# Patient Record
Sex: MALE | Race: ASIAN | NOT HISPANIC OR LATINO | Employment: FULL TIME | URBAN - METROPOLITAN AREA
[De-identification: names, ages, dates, MRNs, and addresses within clinical notes are randomized per-mention and may not be internally consistent; named-entity substitution may affect disease eponyms.]

---

## 2017-02-02 ENCOUNTER — OFFICE VISIT (OUTPATIENT)
Dept: FAMILY MEDICINE | Facility: CLINIC | Age: 62
End: 2017-02-02
Payer: COMMERCIAL

## 2017-02-02 VITALS
HEIGHT: 68 IN | WEIGHT: 163 LBS | BODY MASS INDEX: 24.71 KG/M2 | SYSTOLIC BLOOD PRESSURE: 122 MMHG | HEART RATE: 74 BPM | DIASTOLIC BLOOD PRESSURE: 70 MMHG | TEMPERATURE: 97 F

## 2017-02-02 DIAGNOSIS — Z12.5 SCREENING FOR PROSTATE CANCER: ICD-10-CM

## 2017-02-02 DIAGNOSIS — Z71.89 ADVANCED DIRECTIVES, COUNSELING/DISCUSSION: ICD-10-CM

## 2017-02-02 DIAGNOSIS — Z00.00 HEALTH MAINTENANCE EXAMINATION: Primary | ICD-10-CM

## 2017-02-02 LAB
ALT SERPL W P-5'-P-CCNC: 64 U/L (ref 0–70)
ANION GAP SERPL CALCULATED.3IONS-SCNC: 5 MMOL/L (ref 3–14)
BASOPHILS # BLD AUTO: 0 10E9/L (ref 0–0.2)
BASOPHILS NFR BLD AUTO: 0.3 %
BUN SERPL-MCNC: 17 MG/DL (ref 7–30)
CALCIUM SERPL-MCNC: 8.8 MG/DL (ref 8.5–10.1)
CHLORIDE SERPL-SCNC: 107 MMOL/L (ref 94–109)
CHOLEST SERPL-MCNC: 216 MG/DL
CO2 SERPL-SCNC: 30 MMOL/L (ref 20–32)
CREAT SERPL-MCNC: 0.72 MG/DL (ref 0.66–1.25)
DIFFERENTIAL METHOD BLD: ABNORMAL
EOSINOPHIL # BLD AUTO: 0.1 10E9/L (ref 0–0.7)
EOSINOPHIL NFR BLD AUTO: 1.2 %
ERYTHROCYTE [DISTWIDTH] IN BLOOD BY AUTOMATED COUNT: 18.6 % (ref 10–15)
GFR SERPL CREATININE-BSD FRML MDRD: ABNORMAL ML/MIN/1.7M2
GLUCOSE SERPL-MCNC: 122 MG/DL (ref 70–99)
HCT VFR BLD AUTO: 40.5 % (ref 40–53)
HDLC SERPL-MCNC: 51 MG/DL
HGB BLD-MCNC: 13.1 G/DL (ref 13.3–17.7)
LDLC SERPL CALC-MCNC: 148 MG/DL
LYMPHOCYTES # BLD AUTO: 1.8 10E9/L (ref 0.8–5.3)
LYMPHOCYTES NFR BLD AUTO: 26 %
MCH RBC QN AUTO: 21.1 PG (ref 26.5–33)
MCHC RBC AUTO-ENTMCNC: 32.3 G/DL (ref 31.5–36.5)
MCV RBC AUTO: 65 FL (ref 78–100)
MONOCYTES # BLD AUTO: 0.4 10E9/L (ref 0–1.3)
MONOCYTES NFR BLD AUTO: 6.1 %
NEUTROPHILS # BLD AUTO: 4.6 10E9/L (ref 1.6–8.3)
NEUTROPHILS NFR BLD AUTO: 66.4 %
NONHDLC SERPL-MCNC: 165 MG/DL
PLATELET # BLD AUTO: 279 10E9/L (ref 150–450)
POTASSIUM SERPL-SCNC: 4.4 MMOL/L (ref 3.4–5.3)
PSA SERPL-ACNC: 5.94 UG/L (ref 0–4)
RBC # BLD AUTO: 6.22 10E12/L (ref 4.4–5.9)
SODIUM SERPL-SCNC: 142 MMOL/L (ref 133–144)
TRIGL SERPL-MCNC: 86 MG/DL
WBC # BLD AUTO: 6.9 10E9/L (ref 4–11)

## 2017-02-02 PROCEDURE — 99396 PREV VISIT EST AGE 40-64: CPT | Performed by: FAMILY MEDICINE

## 2017-02-02 PROCEDURE — 84460 ALANINE AMINO (ALT) (SGPT): CPT | Performed by: FAMILY MEDICINE

## 2017-02-02 PROCEDURE — 80061 LIPID PANEL: CPT | Performed by: FAMILY MEDICINE

## 2017-02-02 PROCEDURE — 80048 BASIC METABOLIC PNL TOTAL CA: CPT | Performed by: FAMILY MEDICINE

## 2017-02-02 PROCEDURE — 36415 COLL VENOUS BLD VENIPUNCTURE: CPT | Performed by: FAMILY MEDICINE

## 2017-02-02 PROCEDURE — G0103 PSA SCREENING: HCPCS | Performed by: FAMILY MEDICINE

## 2017-02-02 PROCEDURE — 85025 COMPLETE CBC W/AUTO DIFF WBC: CPT | Performed by: FAMILY MEDICINE

## 2017-02-02 NOTE — NURSING NOTE
"Chief Complaint   Patient presents with     Physical     is fasting       Initial /70 mmHg  Pulse 74  Temp(Src) 97  F (36.1  C) (Tympanic)  Ht 5' 8\" (1.727 m)  Wt 163 lb (73.936 kg)  BMI 24.79 kg/m2 Estimated body mass index is 24.79 kg/(m^2) as calculated from the following:    Height as of this encounter: 5' 8\" (1.727 m).    Weight as of this encounter: 163 lb (73.936 kg).  BP completed using cuff size: davy Farfan CMA    "

## 2017-02-02 NOTE — PROGRESS NOTES
SUBJECTIVE:     CC: Francoise Valerio is an 61 year old male who presents for preventative health visit.     Healthy Habits:    Do you get at least three servings of calcium containing foods daily (dairy, green leafy vegetables, etc.)? yes    Amount of exercise or daily activities, outside of work: 2-4 day(s) per week    Problems taking medications regularly No    Medication side effects: No    Have you had an eye exam in the past two years? yes    Do you see a dentist twice per year? yes    Do you have sleep apnea, excessive snoring or daytime drowsiness?no            Today's PHQ-2 Score:   PHQ-2 ( 1999 Pfizer) 2/2/2017 1/21/2015   Q1: Little interest or pleasure in doing things 0 0   Q2: Feeling down, depressed or hopeless 0 0   PHQ-2 Score 0 0       Abuse: Current or Past(Physical, Sexual or Emotional)- No  Do you feel safe in your environment - Yes    Social History   Substance Use Topics     Smoking status: Never Smoker      Smokeless tobacco: Never Used     Alcohol Use: No     The patient does not drink >3 drinks per day nor >7 drinks per week.    Last PSA: No results found for: PSA    Recent Labs   Lab Test  01/21/15   0831  12/11/13   0904   CHOL  174  201*   HDL  51  48   LDL  106  137*   TRIG  87  75   CHOLHDLRATIO  3.4  4.2       Reviewed orders with patient. Reviewed health maintenance and updated orders accordingly - Yes    All Histories reviewed and updated in Epic.  Past Medical History   Diagnosis Date     Other disorders of bone and cartilage(833.99)      polychondritis on the ear     Anemia, unspecified      Extrinsic asthma, unspecified      Other specified disorders of pancreatic internal secretion       History reviewed. No pertinent past surgical history.    ROS:  C: NEGATIVE for fever, chills, change in weight  I: NEGATIVE for worrisome rashes, moles or lesions  E: NEGATIVE for vision changes or irritation  ENT: NEGATIVE for ear, mouth and throat problems  R: NEGATIVE for significant cough or  SOB  CV: NEGATIVE for chest pain, palpitations or peripheral edema  GI: NEGATIVE for nausea, abdominal pain, heartburn, or change in bowel habits   male: negative for dysuria, hematuria, decreased urinary stream, erectile dysfunction, urethral discharge  M: NEGATIVE for significant arthralgias or myalgia  N: NEGATIVE for weakness, dizziness or paresthesias  P: NEGATIVE for changes in mood or affect    BP Readings from Last 3 Encounters:   02/02/17 122/70   01/21/15 119/83   12/11/13 121/79    Wt Readings from Last 3 Encounters:   02/02/17 163 lb (73.936 kg)   01/21/15 157 lb (71.215 kg)   12/11/13 154 lb (69.854 kg)                  Patient Active Problem List   Diagnosis     CARDIOVASCULAR SCREENING; LDL GOAL LESS THAN 160     Relapsing polychondritis     Advanced directives, counseling/discussion     Vitamin D deficiency     Prediabetes     History reviewed. No pertinent past surgical history.    Social History   Substance Use Topics     Smoking status: Never Smoker      Smokeless tobacco: Never Used     Alcohol Use: No     Family History   Problem Relation Age of Onset     HEART DISEASE Mother      tachycardia/palpitations         Current Outpatient Prescriptions   Medication Sig Dispense Refill     Multiple Vitamin (DAILY MULTIVITAMIN PO) Take 1 tablet by mouth daily.       fish oil-omega-3 fatty acids (FISH OIL) 1000 MG capsule Take 1 capsule by mouth daily.       cholecalciferol (VITAMIN D) 1000 UNIT tablet Take 1 tablet by mouth daily.       Allergies   Allergen Reactions     Penicillins      Recent Labs   Lab Test  01/21/15   0831  12/11/13   0904  11/19/12   0847   A1C  6.1*  6.0  6.2*   LDL  106  137*  136*   HDL  51  48  44   TRIG  87  75  52   ALT  29  35  63   CR  0.76  0.81  0.74   GFRESTIMATED  >90  Non  GFR Calc    >90  >90   GFRESTBLACK  >90   GFR Calc    >90  >90   POTASSIUM  4.6  4.1  4.0   TSH  1.26  0.77  1.26      OBJECTIVE:     /70 mmHg  Pulse 74   "Temp(Src) 97  F (36.1  C) (Tympanic)  Ht 5' 8\" (1.727 m)  Wt 163 lb (73.936 kg)  BMI 24.79 kg/m2  EXAM:  GENERAL: healthy, alert and no distress  EYES: Eyes grossly normal to inspection, PERRL and conjunctivae and sclerae normal  HENT: ear canals and TM's normal, nose and mouth without ulcers or lesions  NECK: no adenopathy, no asymmetry, masses, or scars and thyroid normal to palpation  RESP: lungs clear to auscultation - no rales, rhonchi or wheezes  CV: regular rate and rhythm, normal S1 S2, no S3 or S4, no murmur, click or rub, no peripheral edema and peripheral pulses strong  ABDOMEN: soft, nontender, no hepatosplenomegaly, no masses and bowel sounds normal   (male): normal male genitalia without lesions or urethral discharge, no hernia  MS: no gross musculoskeletal defects noted, no edema  SKIN: no suspicious lesions or rashes  NEURO: Normal strength and tone, mentation intact and speech normal  BACK: no CVA tenderness, no paralumbar tenderness  PSYCH: mentation appears normal, affect normal/bright  LYMPH: no cervical, supraclavicular, axillary, or inguinal adenopathy    ASSESSMENT/PLAN:     1. Advanced directives, counseling/discussion      2. Health maintenance examination    - CBC with platelets and differential  - Basic metabolic panel  (Ca, Cl, CO2, Creat, Gluc, K, Na, BUN)  - ALT  - Lipid Profile with reflex to direct LDL  - PSA, screen    3. Screening for prostate cancer    - PSA, screen    COUNSELING:  Reviewed preventive health counseling, as reflected in patient instructions         reports that he has never smoked. He has never used smokeless tobacco.    Estimated body mass index is 24.79 kg/(m^2) as calculated from the following:    Height as of this encounter: 5' 8\" (1.727 m).    Weight as of this encounter: 163 lb (73.936 kg).       Counseling Resources:  ATP IV Guidelines  Pooled Cohorts Equation Calculator  FRAX Risk Assessment  ICSI Preventive Guidelines  Dietary Guidelines for Americans, " 2010  USDA's MyPlate  ASA Prophylaxis  Lung CA Screening    Tesfaye Kelley MD  [facility] [place]

## 2018-02-19 ENCOUNTER — OFFICE VISIT (OUTPATIENT)
Dept: FAMILY MEDICINE | Facility: CLINIC | Age: 63
End: 2018-02-19
Payer: COMMERCIAL

## 2018-02-19 VITALS
HEART RATE: 85 BPM | RESPIRATION RATE: 14 BRPM | WEIGHT: 159 LBS | HEIGHT: 68 IN | SYSTOLIC BLOOD PRESSURE: 135 MMHG | OXYGEN SATURATION: 99 % | BODY MASS INDEX: 24.1 KG/M2 | DIASTOLIC BLOOD PRESSURE: 86 MMHG | TEMPERATURE: 97.3 F

## 2018-02-19 DIAGNOSIS — Z00.00 HEALTHCARE MAINTENANCE: Primary | ICD-10-CM

## 2018-02-19 DIAGNOSIS — Z12.5 SCREENING FOR PROSTATE CANCER: ICD-10-CM

## 2018-02-19 DIAGNOSIS — R73.09 ELEVATED GLUCOSE: ICD-10-CM

## 2018-02-19 LAB
ERYTHROCYTE [DISTWIDTH] IN BLOOD BY AUTOMATED COUNT: 17.7 % (ref 10–15)
HBA1C MFR BLD: 6.2 % (ref 4.3–6)
HCT VFR BLD AUTO: 41.3 % (ref 40–53)
HGB BLD-MCNC: 12.9 G/DL (ref 13.3–17.7)
MCH RBC QN AUTO: 21 PG (ref 26.5–33)
MCHC RBC AUTO-ENTMCNC: 31.2 G/DL (ref 31.5–36.5)
MCV RBC AUTO: 67 FL (ref 78–100)
PLATELET # BLD AUTO: 229 10E9/L (ref 150–450)
RBC # BLD AUTO: 6.15 10E12/L (ref 4.4–5.9)
WBC # BLD AUTO: 5.2 10E9/L (ref 4–11)

## 2018-02-19 PROCEDURE — 80061 LIPID PANEL: CPT | Performed by: FAMILY MEDICINE

## 2018-02-19 PROCEDURE — 83036 HEMOGLOBIN GLYCOSYLATED A1C: CPT | Performed by: FAMILY MEDICINE

## 2018-02-19 PROCEDURE — G0103 PSA SCREENING: HCPCS | Performed by: FAMILY MEDICINE

## 2018-02-19 PROCEDURE — 36415 COLL VENOUS BLD VENIPUNCTURE: CPT | Performed by: FAMILY MEDICINE

## 2018-02-19 PROCEDURE — 85027 COMPLETE CBC AUTOMATED: CPT | Performed by: FAMILY MEDICINE

## 2018-02-19 PROCEDURE — 99396 PREV VISIT EST AGE 40-64: CPT | Performed by: FAMILY MEDICINE

## 2018-02-19 PROCEDURE — 80053 COMPREHEN METABOLIC PANEL: CPT | Performed by: FAMILY MEDICINE

## 2018-02-19 NOTE — PATIENT INSTRUCTIONS
Preventive Health Recommendations  Male Ages 50 - 64    Yearly exam:             See your health care provider every year in order to  o   Review health changes.   o   Discuss preventive care.    o   Review your medicines if your doctor has prescribed any.     Have a cholesterol test every 5 years, or more frequently if you are at risk for high cholesterol/heart disease.     Have a diabetes test (fasting glucose) every three years. If you are at risk for diabetes, you should have this test more often.     Have a colonoscopy at age 50, or have a yearly FIT test (stool test). These exams will check for colon cancer.      Talk with your health care provider about whether or not a prostate cancer screening test (PSA) is right for you.    You should be tested each year for STDs (sexually transmitted diseases), if you re at risk.     Shots: Get a flu shot each year. Get a tetanus shot every 10 years.     Nutrition:    Eat at least 5 servings of fruits and vegetables daily.     Eat whole-grain bread, whole-wheat pasta and brown rice instead of white grains and rice.     Talk to your provider about Calcium and Vitamin D.     Lifestyle    Exercise for at least 150 minutes a week (30 minutes a day, 5 days a week). This will help you control your weight and prevent disease.     Limit alcohol to one drink per day.     No smoking.     Wear sunscreen to prevent skin cancer.     See your dentist every six months for an exam and cleaning.     See your eye doctor every 1 to 2 years.      Preventive Health Recommendations  Male Ages 50 - 64    Yearly exam:             See your health care provider every year in order to  o   Review health changes.   o   Discuss preventive care.    o   Review your medicines if your doctor has prescribed any.     Have a cholesterol test every 5 years, or more frequently if you are at risk for high cholesterol/heart disease.     Have a diabetes test (fasting glucose) every three years. If you are at  risk for diabetes, you should have this test more often.     Have a colonoscopy at age 50, or have a yearly FIT test (stool test). These exams will check for colon cancer.      Talk with your health care provider about whether or not a prostate cancer screening test (PSA) is right for you.    You should be tested each year for STDs (sexually transmitted diseases), if you re at risk.     Shots: Get a flu shot each year. Get a tetanus shot every 10 years.     Nutrition:    Eat at least 5 servings of fruits and vegetables daily.     Eat whole-grain bread, whole-wheat pasta and brown rice instead of white grains and rice.     Talk to your provider about Calcium and Vitamin D.     Lifestyle    Exercise for at least 150 minutes a week (30 minutes a day, 5 days a week). This will help you control your weight and prevent disease.     Limit alcohol to one drink per day.     No smoking.     Wear sunscreen to prevent skin cancer.     See your dentist every six months for an exam and cleaning.     See your eye doctor every 1 to 2 years.

## 2018-02-19 NOTE — NURSING NOTE
"Chief Complaint   Patient presents with     Physical       Initial /86 (Cuff Size: Adult Regular)  Pulse 85  Temp 97.3  F (36.3  C) (Tympanic)  Resp 14  Ht 5' 8\" (1.727 m)  Wt 159 lb (72.1 kg)  SpO2 99%  BMI 24.18 kg/m2 Estimated body mass index is 24.18 kg/(m^2) as calculated from the following:    Height as of this encounter: 5' 8\" (1.727 m).    Weight as of this encounter: 159 lb (72.1 kg).  Medication Reconciliation: complete   Suyapa Weaver, CMA    "

## 2018-02-19 NOTE — PROGRESS NOTES
SUBJECTIVE:   CC: Francoise Valerio is an 62 year old male who presents for preventative health visit.     Healthy Habits:    Do you get at least three servings of calcium containing foods daily (dairy, green leafy vegetables, etc.)? no, taking calcium and/or vitamin D supplement: yes     Amount of exercise or daily activities, outside of work: 6-7 day(s) per week    Problems taking medications regularly No    Medication side effects: No    Have you had an eye exam in the past two years? yes    Do you see a dentist twice per year? yes    Do you have sleep apnea, excessive snoring or daytime drowsiness?yes       Today's PHQ-2 Score:   PHQ-2 ( 1999 Pfizer) 2/2/2017 1/21/2015   Q1: Little interest or pleasure in doing things 0 0   Q2: Feeling down, depressed or hopeless 0 0   PHQ-2 Score 0 0       Abuse: Current or Past(Physical, Sexual or Emotional)- No  Do you feel safe in your environment - Yes    Social History   Substance Use Topics     Smoking status: Never Smoker     Smokeless tobacco: Never Used     Alcohol use No      If you drink alcohol do you typically have >3 drinks per day or >7 drinks per week? No                      Last PSA:   PSA   Date Value Ref Range Status   02/02/2017 5.94 (H) 0 - 4 ug/L Final     Comment:     Assay Method:  Chemiluminescence using Siemens Vista analyzer       Reviewed orders with patient. Reviewed health maintenance and updated orders accordingly - Yes  BP Readings from Last 3 Encounters:   02/19/18 135/86   02/02/17 122/70   01/21/15 119/83    Wt Readings from Last 3 Encounters:   02/19/18 159 lb (72.1 kg)   02/02/17 163 lb (73.9 kg)   01/21/15 157 lb (71.2 kg)                  Patient Active Problem List   Diagnosis     CARDIOVASCULAR SCREENING; LDL GOAL LESS THAN 160     Relapsing polychondritis     Advanced directives, counseling/discussion     Vitamin D deficiency     Prediabetes     No past surgical history on file.    Social History   Substance Use Topics     Smoking  status: Never Smoker     Smokeless tobacco: Never Used     Alcohol use No     Family History   Problem Relation Age of Onset     HEART DISEASE Mother      tachycardia/palpitations         Current Outpatient Prescriptions   Medication Sig Dispense Refill     Ascorbic Acid (VITAMIN C PO) Take by mouth daily       Multiple Vitamin (DAILY MULTIVITAMIN PO) Take 1 tablet by mouth daily.       fish oil-omega-3 fatty acids (FISH OIL) 1000 MG capsule Take 1 capsule by mouth daily.       cholecalciferol (VITAMIN D) 1000 UNIT tablet Take 1 tablet by mouth daily.       Allergies   Allergen Reactions     Penicillins      Recent Labs   Lab Test  02/02/17   0854  01/21/15   0831  12/11/13   0904  11/19/12   0847   A1C   --   6.1*  6.0  6.2*   LDL  148*  106  137*  136*   HDL  51  51  48  44   TRIG  86  87  75  52   ALT  64  29  35  63   CR  0.72  0.76  0.81  0.74   GFRESTIMATED  >90  Non  GFR Calc    >90  Non  GFR Calc    >90  >90   GFRESTBLACK  >90   GFR Calc    >90   GFR Calc    >90  >90   POTASSIUM  4.4  4.6  4.1  4.0   TSH   --   1.26  0.77  1.26        Reviewed and updated as needed this visit by clinical staff         Reviewed and updated as needed this visit by Provider        Past Medical History:   Diagnosis Date     Anemia, unspecified      Extrinsic asthma, unspecified      Other disorders of bone and cartilage(733.99)     polychondritis on the ear     Other specified disorders of pancreatic internal secretion       No past surgical history on file.    ROS:  C: NEGATIVE for fever, chills, change in weight  I: NEGATIVE for worrisome rashes, moles or lesions  E: NEGATIVE for vision changes or irritation  ENT: NEGATIVE for ear, mouth and throat problems  R: NEGATIVE for significant cough or SOB  CV: NEGATIVE for chest pain, palpitations or peripheral edema  GI: NEGATIVE for nausea, abdominal pain, heartburn, or change in bowel habits   male: negative for  "dysuria, hematuria, decreased urinary stream, erectile dysfunction, urethral discharge  M: NEGATIVE for significant arthralgias or myalgia  N: NEGATIVE for weakness, dizziness or paresthesias  P: NEGATIVE for changes in mood or affect    OBJECTIVE:   /86 (Cuff Size: Adult Regular)  Pulse 85  Temp 97.3  F (36.3  C) (Tympanic)  Resp 14  Ht 5' 8\" (1.727 m)  Wt 159 lb (72.1 kg)  SpO2 99%  BMI 24.18 kg/m2  EXAM:  GENERAL: healthy, alert and no distress  EYES: Eyes grossly normal to inspection, PERRL and conjunctivae and sclerae normal  HENT: ear canals and TM's normal, nose and mouth without ulcers or lesions  NECK: no adenopathy, no asymmetry, masses, or scars and thyroid normal to palpation  RESP: lungs clear to auscultation - no rales, rhonchi or wheezes  CV: regular rate and rhythm, normal S1 S2, no S3 or S4, no murmur, click or rub, no peripheral edema and peripheral pulses strong  ABDOMEN: soft, nontender, no hepatosplenomegaly, no masses and bowel sounds normal   (male): normal male genitalia without lesions or urethral discharge, no hernia  MS: no gross musculoskeletal defects noted, no edema  SKIN: no suspicious lesions or rashes  NEURO: Normal strength and tone, mentation intact and speech normal  BACK: no CVA tenderness, no paralumbar tenderness  PSYCH: mentation appears normal, affect normal/bright  LYMPH: no cervical, supraclavicular, axillary, or inguinal adenopathy    ASSESSMENT/PLAN:       ICD-10-CM    1. Healthcare maintenance Z00.00 CBC with platelets     Comprehensive metabolic panel     Lipid panel reflex to direct LDL Fasting     PSA, screen     Hemoglobin A1c   2. Screening for prostate cancer Z12.5 PSA, screen   3. Elevated glucose R73.09 Hemoglobin A1c       COUNSELING:  Reviewed preventive health counseling, as reflected in patient instructions       reports that he has never smoked. He has never used smokeless tobacco.    Estimated body mass index is 24.78 kg/(m^2) as calculated " "from the following:    Height as of 2/2/17: 5' 8\" (1.727 m).    Weight as of 2/2/17: 163 lb (73.9 kg).       Counseling Resources:  ATP IV Guidelines  Pooled Cohorts Equation Calculator  FRAX Risk Assessment  ICSI Preventive Guidelines  Dietary Guidelines for Americans, 2010  USDA's MyPlate  ASA Prophylaxis  Lung CA Screening    Tesfaye Kelley MD  [facility] [place]  "

## 2018-02-19 NOTE — MR AVS SNAPSHOT
After Visit Summary   2/19/2018    Francoise Valerio    MRN: [id number]           Patient Information     Date Of Birth          1955        Visit Information        Provider Department      2/19/2018 10:40 AM Tesfaye Kelley MD [facility] [place]        Today's Diagnoses     Healthcare maintenance    -  1    Screening for prostate cancer        Elevated glucose          Care Instructions      Preventive Health Recommendations  Male Ages 50 - 64    Yearly exam:             See your health care provider every year in order to  o   Review health changes.   o   Discuss preventive care.    o   Review your medicines if your doctor has prescribed any.     Have a cholesterol test every 5 years, or more frequently if you are at risk for high cholesterol/heart disease.     Have a diabetes test (fasting glucose) every three years. If you are at risk for diabetes, you should have this test more often.     Have a colonoscopy at age 50, or have a yearly FIT test (stool test). These exams will check for colon cancer.      Talk with your health care provider about whether or not a prostate cancer screening test (PSA) is right for you.    You should be tested each year for STDs (sexually transmitted diseases), if you re at risk.     Shots: Get a flu shot each year. Get a tetanus shot every 10 years.     Nutrition:    Eat at least 5 servings of fruits and vegetables daily.     Eat whole-grain bread, whole-wheat pasta and brown rice instead of white grains and rice.     Talk to your provider about Calcium and Vitamin D.     Lifestyle    Exercise for at least 150 minutes a week (30 minutes a day, 5 days a week). This will help you control your weight and prevent disease.     Limit alcohol to one drink per day.     No smoking.     Wear sunscreen to prevent skin cancer.     See your dentist every six months for an exam and cleaning.     See your eye doctor every 1 to 2 years.      Preventive Health  Recommendations  Male Ages 50 - 64    Yearly exam:             See your health care provider every year in order to  o   Review health changes.   o   Discuss preventive care.    o   Review your medicines if your doctor has prescribed any.     Have a cholesterol test every 5 years, or more frequently if you are at risk for high cholesterol/heart disease.     Have a diabetes test (fasting glucose) every three years. If you are at risk for diabetes, you should have this test more often.     Have a colonoscopy at age 50, or have a yearly FIT test (stool test). These exams will check for colon cancer.      Talk with your health care provider about whether or not a prostate cancer screening test (PSA) is right for you.    You should be tested each year for STDs (sexually transmitted diseases), if you re at risk.     Shots: Get a flu shot each year. Get a tetanus shot every 10 years.     Nutrition:    Eat at least 5 servings of fruits and vegetables daily.     Eat whole-grain bread, whole-wheat pasta and brown rice instead of white grains and rice.     Talk to your provider about Calcium and Vitamin D.     Lifestyle    Exercise for at least 150 minutes a week (30 minutes a day, 5 days a week). This will help you control your weight and prevent disease.     Limit alcohol to one drink per day.     No smoking.     Wear sunscreen to prevent skin cancer.     See your dentist every six months for an exam and cleaning.     See your eye doctor every 1 to 2 years.            Follow-ups after your visit        Who to contact     If you have questions or need follow up information about today's clinic visit or your schedule please contact [facility] directly at [phone].  Normal or non-critical lab and imaging results will be communicated to you by MyChart, letter or phone within 4 business days after the clinic has received the results. If you do not hear from us within 7 days, please contact the clinic  "through [employer] or phone. If you have a critical or abnormal lab result, we will notify you by phone as soon as possible.  Submit refill requests through [employer] or call your pharmacy and they will forward the refill request to us. Please allow 3 business days for your refill to be completed.          Additional Information About Your Visit        [employer] Information     [employer] gives you secure access to your electronic health record. If you see a primary care provider, you can also send messages to your care team and make appointments. If you have questions, please call your primary care clinic.  If you do not have a primary care provider, please call [phone] and they will assist you.        Care EveryWhere ID     This is your Care EveryWhere ID. This could be used by other organizations to access your [place] medical records  [id number]        Your Vitals Were     Pulse Temperature Respirations Height Pulse Oximetry BMI (Body Mass Index)    85 97.3  F (36.3  C) (Tympanic) 14 5' 8\" (1.727 m) 99% 24.18 kg/m2       Blood Pressure from Last 3 Encounters:   02/19/18 135/86   02/02/17 122/70   01/21/15 119/83    Weight from Last 3 Encounters:   02/19/18 159 lb (72.1 kg)   02/02/17 163 lb (73.9 kg)   01/21/15 157 lb (71.2 kg)              We Performed the Following     CBC with platelets     Comprehensive metabolic panel     Hemoglobin A1c     Lipid panel reflex to direct LDL Fasting     PSA, screen        Primary Care Provider Office Phone # Fax #    Tesfaye Kelley [phone]       [address] [facility] [address]        Equal Access to Services     [facility]: Hadii aad ku hadasho Soomaali, waaxda luqadaha, qaybta kaalmada aneta, audra rebolledo. So [facility] [phone].    ATENCIÓN: Si habla español, tiene a olmos disposición servicios gratuitos de asistencia lingüística. Alexys al [phone].    We comply with applicable federal civil rights laws and " [place] laws. We do not discriminate on the basis of race, color, national origin, age, disability, sex, sexual orientation, or gender identity.            Thank you!     Thank you for choosing [facility]  for your care. Our goal is always to provide you with excellent care. Hearing back from our patients is one way we can continue to improve our services. Please take a few minutes to complete the written survey that you may receive in the mail after your visit with us. Thank you!             Your Updated Medication List - Protect others around you: Learn how to safely use, store and throw away your medicines at www.disposemymeds.org.          This list is accurate as of 2/19/18 10:55 AM.  Always use your most recent med list.                   Brand Name Dispense Instructions for use Diagnosis    cholecalciferol 1000 UNIT tablet    vitamin D3     Take 1 tablet by mouth daily.        DAILY MULTIVITAMIN PO      Take 1 tablet by mouth daily.        fish oil-omega-3 fatty acids 1000 MG capsule      Take 1 capsule by mouth daily.        VITAMIN C PO      Take by mouth daily

## 2018-02-20 LAB
ALBUMIN SERPL-MCNC: 3.9 G/DL (ref 3.4–5)
ALP SERPL-CCNC: 54 U/L (ref 40–150)
ALT SERPL W P-5'-P-CCNC: 33 U/L (ref 0–70)
ANION GAP SERPL CALCULATED.3IONS-SCNC: 4 MMOL/L (ref 3–14)
AST SERPL W P-5'-P-CCNC: 26 U/L (ref 0–45)
BILIRUB SERPL-MCNC: 0.6 MG/DL (ref 0.2–1.3)
BUN SERPL-MCNC: 15 MG/DL (ref 7–30)
CALCIUM SERPL-MCNC: 8.4 MG/DL (ref 8.5–10.1)
CHLORIDE SERPL-SCNC: 106 MMOL/L (ref 94–109)
CHOLEST SERPL-MCNC: 169 MG/DL
CO2 SERPL-SCNC: 30 MMOL/L (ref 20–32)
CREAT SERPL-MCNC: 0.7 MG/DL (ref 0.66–1.25)
GFR SERPL CREATININE-BSD FRML MDRD: >90 ML/MIN/1.7M2
GLUCOSE SERPL-MCNC: 110 MG/DL (ref 70–99)
HDLC SERPL-MCNC: 46 MG/DL
LDLC SERPL CALC-MCNC: 99 MG/DL
NONHDLC SERPL-MCNC: 123 MG/DL
POTASSIUM SERPL-SCNC: 4.1 MMOL/L (ref 3.4–5.3)
PROT SERPL-MCNC: 7 G/DL (ref 6.8–8.8)
PSA SERPL-ACNC: 5.39 UG/L (ref 0–4)
SODIUM SERPL-SCNC: 140 MMOL/L (ref 133–144)
TRIGL SERPL-MCNC: 121 MG/DL

## 2018-07-30 ENCOUNTER — TELEPHONE (OUTPATIENT)
Dept: FAMILY MEDICINE | Facility: CLINIC | Age: 63
End: 2018-07-30

## 2018-07-30 NOTE — TELEPHONE ENCOUNTER
Wife calling to state that their dog bit the  10 days ago  Does he need any shots- tdap is 2013 so up to date  no rabies after this amount of time-  It is a known family pet- has all of the vaccines.  Advised if he has any future wounds: cuts punctures- to update tdap.    Wife verbalized understanding.    Lizet Alvarez RN BSN  [facility]  [phone]

## 2018-07-30 NOTE — TELEPHONE ENCOUNTER
Reason for Call:  Other appointment    Detailed comments: Ptn's wife called and wonder if ptn needs a booster for a dog bite that happened 2 weeks ago. Ptn is not symptomatic - states he is feeling fine, no pain. Just wondering if he needs a booster.    Phone Number Patient can be reached at: Other phone number:  Please call wife's cell as ptn at work [phone]    Best Time: any    Can we leave a detailed message on this number? YES    Call taken on 7/30/2018 at 9:28 AM by Sloane Brown

## 2019-03-01 ENCOUNTER — TRANSFERRED RECORDS (OUTPATIENT)
Dept: MULTI SPECIALTY CLINIC | Facility: CLINIC | Age: 64
End: 2019-03-01

## 2019-03-14 ENCOUNTER — OFFICE VISIT (OUTPATIENT)
Dept: FAMILY MEDICINE | Facility: CLINIC | Age: 64
End: 2019-03-14
Payer: COMMERCIAL

## 2019-03-14 VITALS
OXYGEN SATURATION: 98 % | HEIGHT: 68 IN | DIASTOLIC BLOOD PRESSURE: 82 MMHG | SYSTOLIC BLOOD PRESSURE: 126 MMHG | RESPIRATION RATE: 14 BRPM | WEIGHT: 156 LBS | BODY MASS INDEX: 23.64 KG/M2 | TEMPERATURE: 97.8 F | HEART RATE: 85 BPM

## 2019-03-14 DIAGNOSIS — Z12.5 SCREENING FOR PROSTATE CANCER: ICD-10-CM

## 2019-03-14 DIAGNOSIS — Z00.00 HEALTHCARE MAINTENANCE: Primary | ICD-10-CM

## 2019-03-14 DIAGNOSIS — R73.03 PREDIABETES: ICD-10-CM

## 2019-03-14 LAB
ALBUMIN SERPL-MCNC: 4 G/DL (ref 3.4–5)
ALP SERPL-CCNC: 47 U/L (ref 40–150)
ALT SERPL W P-5'-P-CCNC: 25 U/L (ref 0–70)
ANION GAP SERPL CALCULATED.3IONS-SCNC: 5 MMOL/L (ref 3–14)
AST SERPL W P-5'-P-CCNC: 19 U/L (ref 0–45)
BILIRUB SERPL-MCNC: 0.8 MG/DL (ref 0.2–1.3)
BUN SERPL-MCNC: 19 MG/DL (ref 7–30)
CALCIUM SERPL-MCNC: 9.5 MG/DL (ref 8.5–10.1)
CHLORIDE SERPL-SCNC: 107 MMOL/L (ref 94–109)
CHOLEST SERPL-MCNC: 182 MG/DL
CO2 SERPL-SCNC: 29 MMOL/L (ref 20–32)
CREAT SERPL-MCNC: 0.77 MG/DL (ref 0.66–1.25)
CREAT UR-MCNC: 148 MG/DL
ERYTHROCYTE [DISTWIDTH] IN BLOOD BY AUTOMATED COUNT: 17.5 % (ref 10–15)
GFR SERPL CREATININE-BSD FRML MDRD: >90 ML/MIN/{1.73_M2}
GLUCOSE SERPL-MCNC: 131 MG/DL (ref 70–99)
HBA1C MFR BLD: 6.4 % (ref 0–5.6)
HCT VFR BLD AUTO: 40.3 % (ref 40–53)
HDLC SERPL-MCNC: 48 MG/DL
HGB BLD-MCNC: 13 G/DL (ref 13.3–17.7)
LDLC SERPL CALC-MCNC: 117 MG/DL
MCH RBC QN AUTO: 21.4 PG (ref 26.5–33)
MCHC RBC AUTO-ENTMCNC: 32.3 G/DL (ref 31.5–36.5)
MCV RBC AUTO: 66 FL (ref 78–100)
MICROALBUMIN UR-MCNC: 7 MG/L
MICROALBUMIN/CREAT UR: 4.95 MG/G CR (ref 0–17)
NONHDLC SERPL-MCNC: 134 MG/DL
PLATELET # BLD AUTO: 232 10E9/L (ref 150–450)
POTASSIUM SERPL-SCNC: 4.4 MMOL/L (ref 3.4–5.3)
PROT SERPL-MCNC: 7.3 G/DL (ref 6.8–8.8)
PSA SERPL-ACNC: 6.36 UG/L (ref 0–4)
RBC # BLD AUTO: 6.07 10E12/L (ref 4.4–5.9)
SODIUM SERPL-SCNC: 141 MMOL/L (ref 133–144)
TRIGL SERPL-MCNC: 84 MG/DL
WBC # BLD AUTO: 5.2 10E9/L (ref 4–11)

## 2019-03-14 PROCEDURE — G0103 PSA SCREENING: HCPCS | Performed by: FAMILY MEDICINE

## 2019-03-14 PROCEDURE — 83036 HEMOGLOBIN GLYCOSYLATED A1C: CPT | Performed by: FAMILY MEDICINE

## 2019-03-14 PROCEDURE — 85027 COMPLETE CBC AUTOMATED: CPT | Performed by: FAMILY MEDICINE

## 2019-03-14 PROCEDURE — 80053 COMPREHEN METABOLIC PANEL: CPT | Performed by: FAMILY MEDICINE

## 2019-03-14 PROCEDURE — 82043 UR ALBUMIN QUANTITATIVE: CPT | Performed by: FAMILY MEDICINE

## 2019-03-14 PROCEDURE — 99396 PREV VISIT EST AGE 40-64: CPT | Performed by: FAMILY MEDICINE

## 2019-03-14 PROCEDURE — 36415 COLL VENOUS BLD VENIPUNCTURE: CPT | Performed by: FAMILY MEDICINE

## 2019-03-14 PROCEDURE — 80061 LIPID PANEL: CPT | Performed by: FAMILY MEDICINE

## 2019-03-14 ASSESSMENT — MIFFLIN-ST. JEOR: SCORE: 1478.14

## 2019-03-14 NOTE — PROGRESS NOTES
SUBJECTIVE:   CC: Francoise Valerio is an 63 year old male who presents for preventive health visit.     Healthy Habits:    Do you get at least three servings of calcium containing foods daily (dairy, green leafy vegetables, etc.)? no, taking calcium and/or vitamin D supplement: yes     Amount of exercise or daily activities, outside of work: 1-2 day(s) per week    Problems taking medications regularly not applicable    Medication side effects: No    Have you had an eye exam in the past two years? yes    Do you see a dentist twice per year? yes    Do you have sleep apnea, excessive snoring or daytime drowsiness? Yes, snoring       Today's PHQ-2 Score:   PHQ-2 ( 1999 Pfizer) 2/19/2018 2/2/2017   Q1: Little interest or pleasure in doing things 0 0   Q2: Feeling down, depressed or hopeless 0 0   PHQ-2 Score 0 0       Abuse: Current or Past(Physical, Sexual or Emotional)- No  Do you feel safe in your environment? Yes    Social History     Tobacco Use     Smoking status: Never Smoker     Smokeless tobacco: Never Used   Substance Use Topics     Alcohol use: No     If you drink alcohol do you typically have >3 drinks per day or >7 drinks per week? No                      Last PSA:   PSA   Date Value Ref Range Status   02/19/2018 5.39 (H) 0 - 4 ug/L Final     Comment:     Assay Method:  Chemiluminescence using Siemens Vista analyzer       Reviewed orders with patient. Reviewed health maintenance and updated orders accordingly - Yes  BP Readings from Last 3 Encounters:   03/14/19 126/82   02/19/18 135/86   02/02/17 122/70    Wt Readings from Last 3 Encounters:   03/14/19 70.8 kg (156 lb)   02/19/18 72.1 kg (159 lb)   02/02/17 73.9 kg (163 lb)                  Patient Active Problem List   Diagnosis     CARDIOVASCULAR SCREENING; LDL GOAL LESS THAN 160     Relapsing polychondritis     Advanced directives, counseling/discussion     Vitamin D deficiency     Prediabetes     No past surgical history on file.    Social History      Tobacco Use     Smoking status: Never Smoker     Smokeless tobacco: Never Used   Substance Use Topics     Alcohol use: No     Family History   Problem Relation Age of Onset     Heart Disease Mother         tachycardia/palpitations         Current Outpatient Medications   Medication Sig Dispense Refill     Ascorbic Acid (VITAMIN C PO) Take by mouth daily       cholecalciferol (VITAMIN D) 1000 UNIT tablet Take 1 tablet by mouth daily.       fish oil-omega-3 fatty acids (FISH OIL) 1000 MG capsule Take 1 capsule by mouth daily.       Multiple Vitamin (DAILY MULTIVITAMIN PO) Take 1 tablet by mouth daily.       Allergies   Allergen Reactions     Penicillins      Recent Labs   Lab Test 02/19/18  1053 02/02/17  0854 01/21/15  0831 12/11/13  0904   A1C 6.2*  --  6.1* 6.0   LDL 99 148* 106 137*   HDL 46 51 51 48   TRIG 121 86 87 75   ALT 33 64 29 35   CR 0.70 0.72 0.76 0.81   GFRESTIMATED >90 >90  Non  GFR Calc   >90  Non  GFR Calc   >90   GFRESTBLACK >90 >90   GFR Calc   >90   GFR Calc   >90   POTASSIUM 4.1 4.4 4.6 4.1   TSH  --   --  1.26 0.77        Reviewed and updated as needed this visit by clinical staff  Allergies         Reviewed and updated as needed this visit by Provider        Past Medical History:   Diagnosis Date     Anemia, unspecified      Extrinsic asthma, unspecified      Other disorders of bone and cartilage(733.99)     polychondritis on the ear     Other specified disorders of pancreatic internal secretion       No past surgical history on file.    ROS:  CONSTITUTIONAL: NEGATIVE for fever, chills, change in weight  INTEGUMENTARY/SKIN: NEGATIVE for worrisome rashes, moles or lesions  EYES: NEGATIVE for vision changes or irritation  ENT: NEGATIVE for ear, mouth and throat problems  RESP: NEGATIVE for significant cough or SOB  CV: NEGATIVE for chest pain, palpitations or peripheral edema  GI: NEGATIVE for nausea, abdominal pain, heartburn,  or change in bowel habits   male: negative for dysuria, hematuria, decreased urinary stream, erectile dysfunction, urethral discharge  MUSCULOSKELETAL: NEGATIVE for significant arthralgias or myalgia  NEURO: NEGATIVE for weakness, dizziness or paresthesias  PSYCHIATRIC: NEGATIVE for changes in mood or affect    OBJECTIVE:   There were no vitals taken for this visit.  EXAM:  GENERAL: healthy, alert and no distress  EYES: Eyes grossly normal to inspection, PERRL and conjunctivae and sclerae normal  HENT: ear canals and TM's normal, nose and mouth without ulcers or lesions  NECK: no adenopathy, no asymmetry, masses, or scars and thyroid normal to palpation  RESP: lungs clear to auscultation - no rales, rhonchi or wheezes  CV: regular rate and rhythm, normal S1 S2, no S3 or S4, no murmur, click or rub, no peripheral edema and peripheral pulses strong  ABDOMEN: soft, nontender, no hepatosplenomegaly, no masses and bowel sounds normal  MS: no gross musculoskeletal defects noted, no edema  SKIN: no suspicious lesions or rashes  NEURO: Normal strength and tone, mentation intact and speech normal  BACK: no CVA tenderness, no paralumbar tenderness  PSYCH: mentation appears normal, affect normal/bright  LYMPH: no cervical, supraclavicular, axillary, or inguinal adenopathy      ASSESSMENT/PLAN:       ICD-10-CM    1. Healthcare maintenance Z00.00 CBC with platelets     Comprehensive metabolic panel     Lipid panel reflex to direct LDL Fasting     PSA, screen     Hemoglobin A1c     Albumin Random Urine Quantitative with Creat Ratio   2. Screening for prostate cancer Z12.5 PSA, screen   3. Prediabetes R73.03 Hemoglobin A1c     Albumin Random Urine Quantitative with Creat Ratio       COUNSELING:  Reviewed preventive health counseling, as reflected in patient instructions    BP Readings from Last 1 Encounters:   03/14/19 126/82     Estimated body mass index is 23.9 kg/m  as calculated from the following:    Height as of this  "encounter: 1.721 m (5' 7.75\").    Weight as of this encounter: 70.8 kg (156 lb).           reports that  has never smoked. he has never used smokeless tobacco.      Counseling Resources:  ATP IV Guidelines  Pooled Cohorts Equation Calculator  FRAX Risk Assessment  ICSI Preventive Guidelines  Dietary Guidelines for Americans, 2010  USDA's MyPlate  ASA Prophylaxis  Lung CA Screening    Tesfaye Kelley MD  [facility] [place]  "

## 2019-03-18 ENCOUNTER — TELEPHONE (OUTPATIENT)
Dept: FAMILY MEDICINE | Facility: CLINIC | Age: 64
End: 2019-03-18

## 2019-03-18 DIAGNOSIS — Z12.11 COLON CANCER SCREENING: Primary | ICD-10-CM

## 2019-03-18 NOTE — TELEPHONE ENCOUNTER
Patient requesting colonoscopy orders. Would like to stay within [facility]. Please call patient when orders are in and give him the phone number to schedule.  [phone] (home) [phone] (work)  Thank you  Niya Brown

## 2019-03-27 ENCOUNTER — TELEPHONE (OUTPATIENT)
Dept: FAMILY MEDICINE | Facility: CLINIC | Age: 64
End: 2019-03-27

## 2019-03-27 NOTE — TELEPHONE ENCOUNTER
Original message states that patient wanted to stay within [facility]. New message from the patient states [facility].    Left message with the patient. Want to confirm that he does want to switch where colonoscopy is done.  Glenys Boland RN

## 2019-03-27 NOTE — TELEPHONE ENCOUNTER
TC - will you please call pt.    Doesn't look like this message was actually routed to Dr Kelley until 3/27.

## 2019-03-27 NOTE — TELEPHONE ENCOUNTER
Reason for Call: Request for an order or referral:    Order or referral being requested: Referral for [facility].    Date needed: as soon as possible    Has the patient been seen by the PCP for this problem? YES    Additional comments: Pt called this afternoon and would like Dr. Kelley to fax over a referral to [facility]. Please fax the referral/ order to the following fax number: [phone]. If there are any additional questions please feel free to contact the pt at the number listed below. Thank you    Phone number Patient can be reached at:  Home number on file [phone] (home)    Best Time:      Can we leave a detailed message on this number?  YES    Call taken on 3/27/2019 at 3:56 PM by Perla Johnson

## 2019-03-28 NOTE — TELEPHONE ENCOUNTER
Spoke with the pt he has an appt at [facility]. States he does not need anything more at this time.  Crystal Seay,

## 2019-03-29 ENCOUNTER — TRANSFERRED RECORDS (OUTPATIENT)
Dept: HEALTH INFORMATION MANAGEMENT | Facility: CLINIC | Age: 64
End: 2019-03-29

## 2019-04-02 ENCOUNTER — TRANSFERRED RECORDS (OUTPATIENT)
Dept: HEALTH INFORMATION MANAGEMENT | Facility: CLINIC | Age: 64
End: 2019-04-02

## 2020-02-05 ENCOUNTER — OFFICE VISIT (OUTPATIENT)
Dept: FAMILY MEDICINE | Facility: CLINIC | Age: 65
End: 2020-02-05
Payer: COMMERCIAL

## 2020-02-05 VITALS
SYSTOLIC BLOOD PRESSURE: 132 MMHG | TEMPERATURE: 97.6 F | HEIGHT: 68 IN | OXYGEN SATURATION: 99 % | WEIGHT: 152 LBS | BODY MASS INDEX: 23.04 KG/M2 | HEART RATE: 85 BPM | DIASTOLIC BLOOD PRESSURE: 78 MMHG

## 2020-02-05 DIAGNOSIS — Z23 NEED FOR PROPHYLACTIC VACCINATION AND INOCULATION AGAINST INFLUENZA: ICD-10-CM

## 2020-02-05 DIAGNOSIS — Z00.00 ROUTINE GENERAL MEDICAL EXAMINATION AT A HEALTH CARE FACILITY: Primary | ICD-10-CM

## 2020-02-05 DIAGNOSIS — Z12.11 SCREEN FOR COLON CANCER: ICD-10-CM

## 2020-02-05 DIAGNOSIS — Z12.5 SCREENING FOR PROSTATE CANCER: ICD-10-CM

## 2020-02-05 DIAGNOSIS — Z20.5 EXPOSURE TO HEPATITIS B: ICD-10-CM

## 2020-02-05 DIAGNOSIS — R73.03 PREDIABETES: ICD-10-CM

## 2020-02-05 DIAGNOSIS — K21.9 GASTROESOPHAGEAL REFLUX DISEASE WITHOUT ESOPHAGITIS: ICD-10-CM

## 2020-02-05 PROCEDURE — 90471 IMMUNIZATION ADMIN: CPT | Performed by: FAMILY MEDICINE

## 2020-02-05 PROCEDURE — 99213 OFFICE O/P EST LOW 20 MIN: CPT | Mod: 25 | Performed by: FAMILY MEDICINE

## 2020-02-05 PROCEDURE — 99396 PREV VISIT EST AGE 40-64: CPT | Mod: 25 | Performed by: FAMILY MEDICINE

## 2020-02-05 PROCEDURE — 90682 RIV4 VACC RECOMBINANT DNA IM: CPT | Performed by: FAMILY MEDICINE

## 2020-02-05 PROCEDURE — 87340 HEPATITIS B SURFACE AG IA: CPT | Performed by: FAMILY MEDICINE

## 2020-02-05 ASSESSMENT — MIFFLIN-ST. JEOR: SCORE: 1455

## 2020-02-05 NOTE — PROGRESS NOTES
3  SUBJECTIVE:   CC: Francoise Valerio is an 64 year old male who presents for preventive health visit.     Healthy Habits:  Do you get at least three servings of calcium containing foods daily (dairy, green leafy vegetables, etc.)? yes  Amount of exercise or daily activities, outside of work: once per week just really walking  Problems taking medications regularly not applicable  Medication side effects: No  Have you had an eye exam in the past two years? yes  Do you see a dentist twice per year? yes  Do you have sleep apnea, excessive snoring or daytime drowsiness?yes      Today's PHQ-2 Score:   PHQ-2 ( 1999 Pfizer) 2/5/2020 3/14/2019   Q1: Little interest or pleasure in doing things 0 0   Q2: Feeling down, depressed or hopeless 0 0   PHQ-2 Score 0 0       Abuse: Current or Past(Physical, Sexual or Emotional)- No  Do you feel safe in your environment? Yes        Social History     Tobacco Use     Smoking status: Never Smoker     Smokeless tobacco: Never Used   Substance Use Topics     Alcohol use: No     If you drink alcohol do you typically have >3 drinks per day or >7 drinks per week? No                      Last PSA:   PSA   Date Value Ref Range Status   03/14/2019 6.36 (H) 0 - 4 ug/L Final     Comment:     Assay Method:  Chemiluminescence using Siemens Vista analyzer       Reviewed orders with patient. Reviewed health maintenance and updated orders accordingly - Yes  BP Readings from Last 3 Encounters:   02/05/20 132/78   03/14/19 126/82   02/19/18 135/86    Wt Readings from Last 3 Encounters:   02/05/20 68.9 kg (152 lb)   03/14/19 70.8 kg (156 lb)   02/19/18 72.1 kg (159 lb)                  Patient Active Problem List   Diagnosis     CARDIOVASCULAR SCREENING; LDL GOAL LESS THAN 160     Relapsing polychondritis     Advanced directives, counseling/discussion     Vitamin D deficiency     Prediabetes     No past surgical history on file.    Social History     Tobacco Use     Smoking status: Never Smoker     Smokeless  tobacco: Never Used   Substance Use Topics     Alcohol use: No     Family History   Problem Relation Age of Onset     Heart Disease Mother         tachycardia/palpitations         Current Outpatient Medications   Medication Sig Dispense Refill     cholecalciferol (VITAMIN D) 1000 UNIT tablet Take 1 tablet by mouth daily.       Multiple Vitamin (DAILY MULTIVITAMIN PO) Take 1 tablet by mouth daily.       Allergies   Allergen Reactions     Penicillins      Recent Labs   Lab Test 03/14/19  1009 02/19/18  1053 02/02/17  0854 01/21/15  0831 12/11/13  0904   A1C 6.4* 6.2*  --  6.1* 6.0   * 99 148* 106 137*   HDL 48 46 51 51 48   TRIG 84 121 86 87 75   ALT 25 33 64 29 35   CR 0.77 0.70 0.72 0.76 0.81   GFRESTIMATED >90 >90 >90  Non  GFR Calc   >90  Non  GFR Calc   >90   GFRESTBLACK >90 >90 >90   GFR Calc   >90   GFR Calc   >90   POTASSIUM 4.4 4.1 4.4 4.6 4.1   TSH  --   --   --  1.26 0.77        Reviewed and updated as needed this visit by clinical staff  Tobacco  Allergies  Meds         Reviewed and updated as needed this visit by Provider        Past Medical History:   Diagnosis Date     Anemia, unspecified      Extrinsic asthma, unspecified      Other disorders of bone and cartilage(733.99)     polychondritis on the ear     Other specified disorders of pancreatic internal secretion       No past surgical history on file.    ROS:  CONSTITUTIONAL: NEGATIVE for fever, chills, change in weight  INTEGUMENTARY/SKIN: NEGATIVE for worrisome rashes, moles or lesions  EYES: NEGATIVE for vision changes or irritation  ENT: NEGATIVE for ear, mouth and throat problems  RESP: NEGATIVE for significant cough or SOB  CV: NEGATIVE for chest pain, palpitations or peripheral edema  GI: NEGATIVE for nausea, abdominal pain, heartburn, or change in bowel habits   male: negative for dysuria, hematuria, decreased urinary stream, erectile dysfunction, urethral  "discharge  MUSCULOSKELETAL: NEGATIVE for significant arthralgias or myalgia  NEURO: NEGATIVE for weakness, dizziness or paresthesias  PSYCHIATRIC: NEGATIVE for changes in mood or affect    OBJECTIVE:   /78   Pulse 85   Temp 97.6  F (36.4  C) (Tympanic)   Ht 1.721 m (5' 7.75\")   Wt 68.9 kg (152 lb)   SpO2 99%   BMI 23.28 kg/m    EXAM:  GENERAL: healthy, alert and no distress  EYES: Eyes grossly normal to inspection, PERRL and conjunctivae and sclerae normal  HENT: ear canals and TM's normal, nose and mouth without ulcers or lesions  NECK: no adenopathy, no asymmetry, masses, or scars and thyroid normal to palpation  RESP: lungs clear to auscultation - no rales, rhonchi or wheezes  CV: regular rate and rhythm, normal S1 S2, no S3 or S4, no murmur, click or rub, no peripheral edema and peripheral pulses strong  ABDOMEN: soft, nontender, no hepatosplenomegaly, no masses and bowel sounds normal  MS: no gross musculoskeletal defects noted, no edema  SKIN: no suspicious lesions or rashes  NEURO: Normal strength and tone, mentation intact and speech normal  BACK: no CVA tenderness, no paralumbar tenderness  PSYCH: mentation appears normal, affect normal/bright  LYMPH: no cervical, supraclavicular, axillary, or inguinal adenopathy        ASSESSMENT/PLAN:   1. Routine general medical examination at a health care facility    - CBC with platelets; Future  - Comprehensive metabolic panel; Future  - Lipid panel reflex to direct LDL Fasting; Future  - Hemoglobin A1c; Future  - Albumin Random Urine Quantitative with Creat Ratio; Future  - PSA, screen; Future    2. Prediabetes  Has been stable, will keep monitoring   - Hemoglobin A1c; Future  - Albumin Random Urine Quantitative with Creat Ratio; Future    3. Screen for colon cancer      4. Screening for prostate cancer    - PSA, screen; Future    5. Exposure to hepatitis B  Has exposure in the past while staying at [facility], not checked screening test, will review the lab " "and update pt   - Hepatitis B core antibody; Future  - Hepatitis B Surface Antibody; Future  - Hepatitis B surface antigen; Future    6. Gastroesophageal reflux disease without esophagitis  Has worsening heartburn and abd pain with bloating for last 1-2 years  Will have him to check EGD for further evaluation   - GASTROENTEROLOGY ADULT REF PROCEDURE ONLY [facility] Winston [phone]  - OFFICE/OUTPT VISIT,EST,LEVL III    COUNSELING:  Reviewed preventive health counseling, as reflected in patient instructions    Estimated body mass index is 23.28 kg/m  as calculated from the following:    Height as of this encounter: 1.721 m (5' 7.75\").    Weight as of this encounter: 68.9 kg (152 lb).         reports that he has never smoked. He has never used smokeless tobacco.      Counseling Resources:  ATP IV Guidelines  Pooled Cohorts Equation Calculator  FRAX Risk Assessment  ICSI Preventive Guidelines  Dietary Guidelines for Americans, 2010  USDA's MyPlate  ASA Prophylaxis  Lung CA Screening    Tesfaye Kelley MD  [facility] [place]  "

## 2020-02-06 DIAGNOSIS — Z00.00 ROUTINE GENERAL MEDICAL EXAMINATION AT A HEALTH CARE FACILITY: ICD-10-CM

## 2020-02-06 DIAGNOSIS — Z20.5 EXPOSURE TO HEPATITIS B: ICD-10-CM

## 2020-02-06 DIAGNOSIS — Z12.5 SCREENING FOR PROSTATE CANCER: ICD-10-CM

## 2020-02-06 DIAGNOSIS — R73.03 PREDIABETES: ICD-10-CM

## 2020-02-06 LAB
ERYTHROCYTE [DISTWIDTH] IN BLOOD BY AUTOMATED COUNT: 17.9 % (ref 10–15)
HBA1C MFR BLD: 6 % (ref 0–5.6)
HCT VFR BLD AUTO: 41.3 % (ref 40–53)
HGB BLD-MCNC: 13.2 G/DL (ref 13.3–17.7)
MCH RBC QN AUTO: 21.2 PG (ref 26.5–33)
MCHC RBC AUTO-ENTMCNC: 32 G/DL (ref 31.5–36.5)
MCV RBC AUTO: 66 FL (ref 78–100)
PLATELET # BLD AUTO: 239 10E9/L (ref 150–450)
RBC # BLD AUTO: 6.24 10E12/L (ref 4.4–5.9)
WBC # BLD AUTO: 6 10E9/L (ref 4–11)

## 2020-02-06 PROCEDURE — 85027 COMPLETE CBC AUTOMATED: CPT | Performed by: FAMILY MEDICINE

## 2020-02-06 PROCEDURE — 86704 HEP B CORE ANTIBODY TOTAL: CPT | Performed by: FAMILY MEDICINE

## 2020-02-06 PROCEDURE — G0103 PSA SCREENING: HCPCS | Performed by: FAMILY MEDICINE

## 2020-02-06 PROCEDURE — 80061 LIPID PANEL: CPT | Performed by: FAMILY MEDICINE

## 2020-02-06 PROCEDURE — 82043 UR ALBUMIN QUANTITATIVE: CPT | Performed by: FAMILY MEDICINE

## 2020-02-06 PROCEDURE — 80053 COMPREHEN METABOLIC PANEL: CPT | Performed by: FAMILY MEDICINE

## 2020-02-06 PROCEDURE — 83036 HEMOGLOBIN GLYCOSYLATED A1C: CPT | Performed by: FAMILY MEDICINE

## 2020-02-06 PROCEDURE — 87340 HEPATITIS B SURFACE AG IA: CPT | Performed by: FAMILY MEDICINE

## 2020-02-06 PROCEDURE — 86706 HEP B SURFACE ANTIBODY: CPT | Performed by: FAMILY MEDICINE

## 2020-02-06 PROCEDURE — 36415 COLL VENOUS BLD VENIPUNCTURE: CPT | Performed by: FAMILY MEDICINE

## 2020-02-07 ENCOUNTER — TELEPHONE (OUTPATIENT)
Dept: FAMILY MEDICINE | Facility: CLINIC | Age: 65
End: 2020-02-07

## 2020-02-07 DIAGNOSIS — R97.20 ELEVATED PROSTATE SPECIFIC ANTIGEN (PSA): Primary | ICD-10-CM

## 2020-02-07 LAB
ALBUMIN SERPL-MCNC: 3.9 G/DL (ref 3.4–5)
ALP SERPL-CCNC: 69 U/L (ref 40–150)
ALT SERPL W P-5'-P-CCNC: 32 U/L (ref 0–70)
ANION GAP SERPL CALCULATED.3IONS-SCNC: 1 MMOL/L (ref 3–14)
AST SERPL W P-5'-P-CCNC: 16 U/L (ref 0–45)
BILIRUB SERPL-MCNC: 0.7 MG/DL (ref 0.2–1.3)
BUN SERPL-MCNC: 15 MG/DL (ref 7–30)
CALCIUM SERPL-MCNC: 8.8 MG/DL (ref 8.5–10.1)
CHLORIDE SERPL-SCNC: 108 MMOL/L (ref 94–109)
CHOLEST SERPL-MCNC: 197 MG/DL
CO2 SERPL-SCNC: 32 MMOL/L (ref 20–32)
CREAT SERPL-MCNC: 0.76 MG/DL (ref 0.66–1.25)
CREAT UR-MCNC: 62 MG/DL
GFR SERPL CREATININE-BSD FRML MDRD: >90 ML/MIN/{1.73_M2}
GLUCOSE SERPL-MCNC: 113 MG/DL (ref 70–99)
HBV CORE AB SERPL QL IA: NONREACTIVE
HBV SURFACE AB SERPL IA-ACNC: 0.49 M[IU]/ML
HBV SURFACE AG SERPL QL IA: NONREACTIVE
HDLC SERPL-MCNC: 50 MG/DL
LDLC SERPL CALC-MCNC: 129 MG/DL
MICROALBUMIN UR-MCNC: <5 MG/L
MICROALBUMIN/CREAT UR: NORMAL MG/G CR (ref 0–17)
NONHDLC SERPL-MCNC: 147 MG/DL
POTASSIUM SERPL-SCNC: 4.1 MMOL/L (ref 3.4–5.3)
PROT SERPL-MCNC: 7.4 G/DL (ref 6.8–8.8)
PSA SERPL-ACNC: 7.06 UG/L (ref 0–4)
SODIUM SERPL-SCNC: 141 MMOL/L (ref 133–144)
TRIGL SERPL-MCNC: 89 MG/DL

## 2020-02-19 ENCOUNTER — OFFICE VISIT (OUTPATIENT)
Dept: FAMILY MEDICINE | Facility: CLINIC | Age: 65
End: 2020-02-19
Payer: COMMERCIAL

## 2020-02-19 VITALS
DIASTOLIC BLOOD PRESSURE: 80 MMHG | HEIGHT: 68 IN | WEIGHT: 151 LBS | OXYGEN SATURATION: 99 % | BODY MASS INDEX: 22.88 KG/M2 | RESPIRATION RATE: 14 BRPM | HEART RATE: 89 BPM | SYSTOLIC BLOOD PRESSURE: 134 MMHG | TEMPERATURE: 96.7 F

## 2020-02-19 DIAGNOSIS — J01.90 ACUTE NON-RECURRENT SINUSITIS, UNSPECIFIED LOCATION: Primary | ICD-10-CM

## 2020-02-19 LAB
FLUAV+FLUBV AG SPEC QL: NEGATIVE
FLUAV+FLUBV AG SPEC QL: NEGATIVE
SPECIMEN SOURCE: NORMAL

## 2020-02-19 PROCEDURE — 99213 OFFICE O/P EST LOW 20 MIN: CPT | Performed by: FAMILY MEDICINE

## 2020-02-19 PROCEDURE — 87804 INFLUENZA ASSAY W/OPTIC: CPT | Performed by: FAMILY MEDICINE

## 2020-02-19 ASSESSMENT — MIFFLIN-ST. JEOR: SCORE: 1450.46

## 2020-02-19 NOTE — PROGRESS NOTES
Subjective     Francoise Valerio is a 64 year old male who presents to clinic today for the following health issues:    HPI   RESPIRATORY SYMPTOMS      Duration: 3 days     Description  sore throat, cough, fever and headache    Severity: moderate    Accompanying signs and symptoms: None    History (predisposing factors):  none    Precipitating or alleviating factors: None    Therapies tried and outcome:  OTC cold medication, vitamin C      Patient Active Problem List   Diagnosis     CARDIOVASCULAR SCREENING; LDL GOAL LESS THAN 160     Relapsing polychondritis     Advanced directives, counseling/discussion     Vitamin D deficiency     Prediabetes     No past surgical history on file.    Social History     Tobacco Use     Smoking status: Never Smoker     Smokeless tobacco: Never Used   Substance Use Topics     Alcohol use: No     Family History   Problem Relation Age of Onset     Heart Disease Mother         tachycardia/palpitations         Current Outpatient Medications   Medication Sig Dispense Refill     Ascorbic Acid (VITAMIN C PO) Take by mouth daily       Allergies   Allergen Reactions     Penicillins      Recent Labs   Lab Test 02/06/20  1046 03/14/19  1009 02/19/18  1053  01/21/15  0831 12/11/13  0904   A1C 6.0* 6.4* 6.2*  --  6.1* 6.0   * 117* 99   < > 106 137*   HDL 50 48 46   < > 51 48   TRIG 89 84 121   < > 87 75   ALT 32 25 33   < > 29 35   CR 0.76 0.77 0.70   < > 0.76 0.81   GFRESTIMATED >90 >90 >90   < > >90  Non  GFR Calc   >90   GFRESTBLACK >90 >90 >90   < > >90   GFR Calc   >90   POTASSIUM 4.1 4.4 4.1   < > 4.6 4.1   TSH  --   --   --   --  1.26 0.77    < > = values in this interval not displayed.      BP Readings from Last 3 Encounters:   02/19/20 134/80   02/05/20 132/78   03/14/19 126/82    Wt Readings from Last 3 Encounters:   02/19/20 68.5 kg (151 lb)   02/05/20 68.9 kg (152 lb)   03/14/19 70.8 kg (156 lb)             Reviewed and updated as needed this visit by  "Provider         Review of Systems   ROS COMP: Constitutional, HEENT, cardiovascular, pulmonary, gi and gu systems are negative, except as otherwise noted.      Objective    /80 (Cuff Size: Adult Large)   Pulse 89   Temp 96.7  F (35.9  C) (Tympanic)   Resp 14   Ht 1.721 m (5' 7.75\")   Wt 68.5 kg (151 lb)   SpO2 99%   BMI 23.13 kg/m    Body mass index is 23.13 kg/m .  Physical Exam   GENERAL: healthy, alert and no distress  NECK: no adenopathy, no asymmetry, masses, or scars and thyroid normal to palpation  RESP: lungs clear to auscultation - no rales, rhonchi or wheezes  CV: regular rate and rhythm, normal S1 S2, no S3 or S4, no murmur, click or rub, no peripheral edema and peripheral pulses strong  ABDOMEN: soft, nontender, no hepatosplenomegaly, no masses and bowel sounds normal  MS: no gross musculoskeletal defects noted, no edema            Assessment & Plan       ICD-10-CM    1. Fever R50.9 Influenza A/B antigen   flu screening test is negative, encouraged conservative management with increase water intake  If not improving within next 2 days, will have him to try abx   Pt acknowledged and agreed with the plan       No follow-ups on file.    Tesfaye Kelley MD  [facility] [place]        "

## 2020-02-21 ENCOUNTER — NURSE TRIAGE (OUTPATIENT)
Dept: NURSING | Facility: CLINIC | Age: 65
End: 2020-02-21

## 2020-02-22 NOTE — TELEPHONE ENCOUNTER
Call received from spouse, Melissa.    Verbal consent given by patient to discuss medical information with spouse.    Melissa was diagnosed with Influenza A and started on Tamiflu.    She wants to know if Tamiflu could be prescribed for [place].    Patient was seen by PCP on 2/19, after 3 days of symptoms, and tested negative for influenza.    He was diagnosed with sinusitis.    He has slightly elevated temp ~ 99.3     Advised to call back if symptoms worsen.    Zofia Purcell RN  [facility]      Reason for Disposition    [1] Follow-up call to recent contact AND [2] information only call, no triage required    Protocols used: INFORMATION ONLY CALL-A-

## 2020-06-16 ENCOUNTER — TELEPHONE (OUTPATIENT)
Dept: FAMILY MEDICINE | Facility: CLINIC | Age: 65
End: 2020-06-16

## 2020-06-16 DIAGNOSIS — Z11.59 SCREENING FOR VIRAL DISEASE: Primary | ICD-10-CM

## 2020-06-16 NOTE — TELEPHONE ENCOUNTER
"Patient is calling requesting COVID serologic antibody testing.  NOTE: Serologic testing is a blood test for 'antibodies' which are made at 10-14 days after you have had symptoms of COVID or were exposed and had an asymptomatic infection.  This does NOT test you for 'active' infection or tell you if you are contagious.    Are you a healthcare worker?  No  Do you currently have a cough, fever, body aches, shortness of breath, or difficulty breathing?  No  Did you previously have cough, fever, body aches, shortness of breath, or difficulty breathing that have now resolved? Has had previous covid symptoms.   Symptoms began back in Feb of 2020  Symptoms started > 14 days ago. Lab order placed per SARS-CoV-2 Serology test Standing Order using indication \"Previously symptomatic >14d since onset, currently asymptomatic\" and diagnosis code \"Screening for viral disease\" (Z11.59)      The patient was informed: \"Testing is limited each day and it may take time for testing to be available to everyone who has called. You will receive a call within 48-72 hours to schedule the serology testing. Please confirm the best number to reach you is [phone]. If you have any questions about scheduling, call [phone].\"     Rebecca Fleming RN, BSN  [facility] [place]    "

## 2020-06-20 DIAGNOSIS — Z11.59 SCREENING FOR VIRAL DISEASE: ICD-10-CM

## 2020-06-20 PROCEDURE — 99000 SPECIMEN HANDLING OFFICE-LAB: CPT | Performed by: FAMILY MEDICINE

## 2020-06-20 PROCEDURE — 86769 SARS-COV-2 COVID-19 ANTIBODY: CPT | Mod: 90 | Performed by: FAMILY MEDICINE

## 2020-06-20 PROCEDURE — 36415 COLL VENOUS BLD VENIPUNCTURE: CPT | Performed by: FAMILY MEDICINE

## 2020-06-20 NOTE — LETTER
June 23, 2020        Francoise Valerio  [address]  [place] [address]      COVID-19 Virus (Coronavirus) Antibody & Titer Reflex   Order: [id number]   Status:  Edited Result - FINAL   Visible to patient:  No (not released) Dx:  Screening for viral disease   Component  3d ago   COVID-19 Antibody Screen  Negative     Comment: No COVID-19 antibodies detected.  Patients within 10 days of symptom onset for    COVID-19 may not produce sufficient levels of detectable antibodies.     Immunocompromised COVID-19 patients may take longer to develop antibodies.                You have tested NEGATIVE for COVID-19 antibodies. This suggests you have not had or been exposed to COVID-19. But it does not mean that for sure.     The test finds antibodies in most people 10 days after they get sick. For some people, it takes longer than 10 days for antibodies to show up. Others may never show antibodies against COVID-19, especially if they have weak immune systems.    If you have COVID-19 symptoms now, please stay home and away from others.     What is antibody testing?    This is a kind of blood test. We take a small sample of your blood, and then test it for something called  antibodies.      Your body makes antibodies to fight infection. If your blood has antibodies for a certain germ, it means you ve been infected with that germ in the past.     Sometimes, antibodies stay in your body for years after you ve had the infection. They can be there even if the germ didn t make you sick. They are a sign that your body fought off the infection.    Will this test find antibodies in everyone who s had COVID-19?    No. The test finds antibodies in most people 10 days after they get sick. For some people, it takes longer than 10 days for antibodies to show up. Others may never show antibodies against COVID-19, especially if they have weak immune systems.    What does it mean if the test finds COVID-19 antibodies?    If we find these  antibodies, it suggests:     This person has had the virus.     Their body s immune system fought the virus.     We don t know if this will help protect someone from getting COVID-19 again. Scientists are still learning about this.    What are the signs of COVID-19?    Signs of COVID-19 can appear from 2 to 14 days (up to 2 weeks) after you re infected. Some people have no symptoms or only mild symptoms. Others get very sick. The most common symptoms are:          Cough    Shortness of breath or trouble breathing  Or at least 2 of these symptoms:    Fever    Chills    Repeated shaking with chills    Muscle pain    Headache    Sore throat    Losing your sense of taste or smell    You may have other symptoms. Please contact your doctor or clinic for any symptoms that worry you.    Where can I get more information?     To learn the [facility] guidelines for staying home, please visit the [place] Department of Health website at [email or web address]/coronavirus/basics.html    To learn more about COVID-19 and how to care for yourself at home, please visit the CDC website at https://www.cdc.gov/coronavirus/2019-ncov/about/steps-when-sick.html    For more options for care at [facility], please visit our website at [email or web address]    [facility] ([facility]) COVID-19 Hotline:  [phone]

## 2020-06-22 LAB
COVID-19 SPIKE RBD ABY TITER: NORMAL
COVID-19 SPIKE RBD ABY: NEGATIVE

## 2020-06-23 NOTE — RESULT ENCOUNTER NOTE
Serology (COVID-19) Notification    You have tested NEGATIVE for COVID-19 antibodies  Letter sent to patient

## 2021-04-03 ENCOUNTER — HEALTH MAINTENANCE LETTER (OUTPATIENT)
Age: 66
End: 2021-04-03

## 2021-05-07 ENCOUNTER — OFFICE VISIT (OUTPATIENT)
Dept: FAMILY MEDICINE | Facility: CLINIC | Age: 66
End: 2021-05-07
Payer: COMMERCIAL

## 2021-05-07 ENCOUNTER — TELEPHONE (OUTPATIENT)
Dept: FAMILY MEDICINE | Facility: CLINIC | Age: 66
End: 2021-05-07

## 2021-05-07 VITALS
RESPIRATION RATE: 16 BRPM | OXYGEN SATURATION: 99 % | WEIGHT: 156.2 LBS | HEIGHT: 67 IN | DIASTOLIC BLOOD PRESSURE: 70 MMHG | SYSTOLIC BLOOD PRESSURE: 135 MMHG | BODY MASS INDEX: 24.52 KG/M2 | HEART RATE: 84 BPM | TEMPERATURE: 98.2 F

## 2021-05-07 DIAGNOSIS — R97.20 ELEVATED PROSTATE SPECIFIC ANTIGEN (PSA): ICD-10-CM

## 2021-05-07 DIAGNOSIS — Z00.00 ROUTINE GENERAL MEDICAL EXAMINATION AT A HEALTH CARE FACILITY: ICD-10-CM

## 2021-05-07 DIAGNOSIS — Z11.4 SCREENING FOR HIV (HUMAN IMMUNODEFICIENCY VIRUS): ICD-10-CM

## 2021-05-07 DIAGNOSIS — K21.00 GASTROESOPHAGEAL REFLUX DISEASE WITH ESOPHAGITIS, UNSPECIFIED WHETHER HEMORRHAGE: ICD-10-CM

## 2021-05-07 DIAGNOSIS — Z12.5 SCREENING FOR PROSTATE CANCER: ICD-10-CM

## 2021-05-07 DIAGNOSIS — Z13.6 CARDIOVASCULAR SCREENING; LDL GOAL LESS THAN 160: Primary | ICD-10-CM

## 2021-05-07 LAB
ALBUMIN SERPL-MCNC: 3.9 G/DL (ref 3.4–5)
ALP SERPL-CCNC: 52 U/L (ref 40–150)
ALT SERPL W P-5'-P-CCNC: 44 U/L (ref 0–70)
ANION GAP SERPL CALCULATED.3IONS-SCNC: 3 MMOL/L (ref 3–14)
AST SERPL W P-5'-P-CCNC: 23 U/L (ref 0–45)
BILIRUB SERPL-MCNC: 0.9 MG/DL (ref 0.2–1.3)
BUN SERPL-MCNC: 13 MG/DL (ref 7–30)
CALCIUM SERPL-MCNC: 8.6 MG/DL (ref 8.5–10.1)
CHLORIDE SERPL-SCNC: 105 MMOL/L (ref 94–109)
CHOLEST SERPL-MCNC: 209 MG/DL
CO2 SERPL-SCNC: 30 MMOL/L (ref 20–32)
CREAT SERPL-MCNC: 0.78 MG/DL (ref 0.66–1.25)
GFR SERPL CREATININE-BSD FRML MDRD: >90 ML/MIN/{1.73_M2}
GLUCOSE SERPL-MCNC: 119 MG/DL (ref 70–99)
HDLC SERPL-MCNC: 58 MG/DL
HIV 1+2 AB+HIV1 P24 AG SERPL QL IA: NONREACTIVE
LDLC SERPL CALC-MCNC: 135 MG/DL
NONHDLC SERPL-MCNC: 151 MG/DL
POTASSIUM SERPL-SCNC: 4.1 MMOL/L (ref 3.4–5.3)
PROT SERPL-MCNC: 7.1 G/DL (ref 6.8–8.8)
SODIUM SERPL-SCNC: 138 MMOL/L (ref 133–144)
TRIGL SERPL-MCNC: 79 MG/DL

## 2021-05-07 PROCEDURE — 99396 PREV VISIT EST AGE 40-64: CPT | Performed by: FAMILY MEDICINE

## 2021-05-07 PROCEDURE — 87389 HIV-1 AG W/HIV-1&-2 AB AG IA: CPT | Performed by: FAMILY MEDICINE

## 2021-05-07 PROCEDURE — 99213 OFFICE O/P EST LOW 20 MIN: CPT | Mod: 25 | Performed by: FAMILY MEDICINE

## 2021-05-07 PROCEDURE — 80061 LIPID PANEL: CPT | Performed by: FAMILY MEDICINE

## 2021-05-07 PROCEDURE — 80053 COMPREHEN METABOLIC PANEL: CPT | Performed by: FAMILY MEDICINE

## 2021-05-07 PROCEDURE — 84153 ASSAY OF PSA TOTAL: CPT | Performed by: FAMILY MEDICINE

## 2021-05-07 PROCEDURE — 36415 COLL VENOUS BLD VENIPUNCTURE: CPT | Performed by: FAMILY MEDICINE

## 2021-05-07 ASSESSMENT — PAIN SCALES - GENERAL: PAINLEVEL: NO PAIN (0)

## 2021-05-07 ASSESSMENT — MIFFLIN-ST. JEOR: SCORE: 1457.15

## 2021-05-07 NOTE — Clinical Note
Please abstract the following data from this visit with this patient into the appropriate field in Epic:    Tests that can be patient reported without a hard copy:    Colonoscopy done on this date: 03/2019 (approximately), by this group: [facility]  Other Tests found in the patient's chart through Chart Review/Care Everywhere:    Colonoscopy done by this group [facility] on this date: 03/2019    Note to Abstraction: If this section is blank, no results were found via Chart Review/Care Everywhere.

## 2021-05-07 NOTE — PROGRESS NOTES
3  SUBJECTIVE:   CC: Francoise Valerio is an 65 year old male who presents for preventive health visit.       Patient has been advised of split billing requirements and indicates understanding: Yes  Healthy Habits:  Do you get at least three servings of calcium containing foods daily (dairy, green leafy vegetables, etc.)? yes  Amount of exercise or daily activities, outside of work: 7 day(s) per week for thirty minutes.  Problems taking medications regularly No  Medication side effects: No  Have you had an eye exam in the past two years? yes  Do you see a dentist twice per year? yes  Do you have sleep apnea, excessive snoring or daytime drowsiness?yes      Patient is overall healthy.    Complains of on and off acid reflux symptoms.  Has not used any medication for  As per patient he had colonoscopy done in 2019 at [facility].  It was not extracted.  He thinks it was normal and he has to do every 5 years    Today's PHQ-2 Score:   PHQ-2 ( 1999 Pfizer) 5/7/2021 2/5/2020   Q1: Little interest or pleasure in doing things 0 0   Q2: Feeling down, depressed or hopeless 0 0   PHQ-2 Score 0 0       Abuse: Current or Past(Physical, Sexual or Emotional)- No  Do you feel safe in your environment? Yes        Social History     Tobacco Use     Smoking status: Never Smoker     Smokeless tobacco: Never Used   Substance Use Topics     Alcohol use: No     If you drink alcohol do you typically have >3 drinks per day or >7 drinks per week? No                      Last PSA:   PSA   Date Value Ref Range Status   02/06/2020 7.06 (H) 0 - 4 ug/L Final     Comment:     Assay Method:  Chemiluminescence using Siemens Vista analyzer       Reviewed orders with patient. Reviewed health maintenance and updated orders accordingly - Yes  Lab work is in process    Reviewed and updated as needed this visit by clinical staff  Tobacco  Allergies  Meds   Med Hx  Surg Hx  Fam Hx  Soc Hx        Reviewed and updated as needed this visit by  "Provider                    ROS:  CONSTITUTIONAL: NEGATIVE for fever, chills, change in weight  INTEGUMENTARY/SKIN: NEGATIVE for worrisome rashes, moles or lesions  EYES: NEGATIVE for vision changes or irritation  ENT: NEGATIVE for ear, mouth and throat problems  RESP: NEGATIVE for significant cough or SOB  CV: NEGATIVE for chest pain, palpitations or peripheral edema  GI: NEGATIVE for nausea, abdominal pain, heartburn, or change in bowel habits   male: negative for dysuria, hematuria, decreased urinary stream, erectile dysfunction, urethral discharge  MUSCULOSKELETAL: NEGATIVE for significant arthralgias or myalgia  NEURO: NEGATIVE for weakness, dizziness or paresthesias  PSYCHIATRIC: NEGATIVE for changes in mood or affect    OBJECTIVE:   /70   Pulse 84   Temp 98.2  F (36.8  C) (Tympanic)   Resp 16   Ht 1.702 m (5' 7\")   Wt 70.9 kg (156 lb 3.2 oz)   SpO2 99%   BMI 24.46 kg/m    EXAM:  GENERAL: healthy, alert and no distress  EYES: Eyes grossly normal to inspection, PERRL and conjunctivae and sclerae normal  HENT: ear canals and TM's normal, nose and mouth without ulcers or lesions  NECK: no adenopathy, no asymmetry, masses, or scars and thyroid normal to palpation  RESP: lungs clear to auscultation - no rales, rhonchi or wheezes  CV: regular rate and rhythm, normal S1 S2, no S3 or S4, no murmur, click or rub, no peripheral edema and peripheral pulses strong  ABDOMEN: soft, nontender, no hepatosplenomegaly, no masses and bowel sounds normal  MS: no gross musculoskeletal defects noted, no edema  SKIN: no suspicious lesions or rashes  NEURO: Normal strength and tone, mentation intact and speech normal  PSYCH: mentation appears normal, affect normal/bright        ASSESSMENT/PLAN:   1. Routine general medical examination at a health care facility    - REVIEW OF HEALTH MAINTENANCE PROTOCOL ORDERS  - HIV Antigen Antibody Combo  - Comprehensive metabolic panel  - Lipid panel reflex to direct LDL Fasting  - " "Prostate spec antigen screen    2. Screening for HIV (human immunodeficiency virus)    - HIV Antigen Antibody Combo    3. CARDIOVASCULAR SCREENING; LDL GOAL LESS THAN 160    - Comprehensive metabolic panel  - Lipid panel reflex to direct LDL Fasting    4. Screening for prostate cancer    - Prostate spec antigen screen    5. Gastroesophageal reflux disease with esophagitis, unspecified whether hemorrhage    - omeprazole (PRILOSEC) 20 MG DR capsule; Take 1 capsule (20 mg) by mouth daily  Dispense: 30 capsule; Refill: 1    6. Elevated prostate specific antigen (PSA)  Patient has elevated trend of PSA.  He was told in the past to see a urologist but not able to see.  We will check it again if this continue to be elevated he should see a urologist.       Patient has been advised of split billing requirements and indicates understanding: Yes  COUNSELING:  Reviewed preventive health counseling, as reflected in patient instructions       Regular exercise       Healthy diet/nutrition    Estimated body mass index is 24.46 kg/m  as calculated from the following:    Height as of this encounter: 1.702 m (5' 7\").    Weight as of this encounter: 70.9 kg (156 lb 3.2 oz).        He reports that he has never smoked. He has never used smokeless tobacco.      Counseling Resources:  ATP IV Guidelines  Pooled Cohorts Equation Calculator  FRAX Risk Assessment  ICSI Preventive Guidelines  Dietary Guidelines for Americans, 2010  USDA's MyPlate  ASA Prophylaxis  Lung CA Screening    Conner Parham MD  [facility]  "

## 2021-05-07 NOTE — TELEPHONE ENCOUNTER
Pt in for physical today, awaiting lab results to complete biometric form which is at my desk. Tyshawn OLSON CMA

## 2021-05-07 NOTE — Clinical Note
Immunization chart prep completed.  Immunization records verified.  Mary Rojas due for Gardasil 9 (HPV)   Please abstract the following data from this visit with this patient into the appropriate field in Epic:    Tests that can be patient reported without a hard copy:    Colonoscopy done on this date: 03/19 (approximately), by this group: [facility], results were normal    Other Tests found in the patient's chart through Chart Review/Care Everywhere:      Note to Abstraction: If this section is blank, no results were found via Chart Review/Care Everywhere.

## 2021-05-08 LAB — PSA SERPL-ACNC: 9.31 UG/L (ref 0–4)

## 2021-05-10 NOTE — TELEPHONE ENCOUNTER
Form has been completed and faxed back.  Copy has been sent to abstraction. Original mailed to pt per his request.Tyshawn OLSON CMA

## 2021-05-11 ENCOUNTER — TELEPHONE (OUTPATIENT)
Dept: FAMILY MEDICINE | Facility: CLINIC | Age: 66
End: 2021-05-11

## 2021-05-11 NOTE — TELEPHONE ENCOUNTER
----- Message from Conner Parham MD sent at 5/11/2021  7:59 AM CDT -----  Please review with the patient about PSA labs, referral provided.    I have reviewed your recent labs. Here are the results:    -PSA is elevated and I would like you to see a urologist about this. I have referred you to:  [facility]: [facility] [place] [phone]   [email or web address]  You can call them to set up a consultation.    -Cholesterol numbers when compared to last year are stable, your is still elevated.  Work on your diet and exercise lose some weight if necessary I would not suggest any new medications.    -Liver and kidney functions are normal  -Glucose is elevated but not in diabetic range.  This is called prediabetes.  We can recheck those labs in 6 months and see if there is any improvement.  Losing weight eating healthy will help.  If this continue to be on this range we may need to do further evaluation to make sure there is no underlying diabetes.      Conner Parham MD  5/11/2021

## 2021-05-18 ENCOUNTER — TELEPHONE (OUTPATIENT)
Dept: FAMILY MEDICINE | Facility: CLINIC | Age: 66
End: 2021-05-18

## 2021-05-18 NOTE — TELEPHONE ENCOUNTER
REVISED Biometric form completed and at the  for . Copy sent to abstraction. Attempted to notify the pt but unable to leave message.  Crystal Seay,

## 2021-05-27 ENCOUNTER — TELEPHONE (OUTPATIENT)
Dept: FAMILY MEDICINE | Facility: CLINIC | Age: 66
End: 2021-05-27

## 2021-05-27 NOTE — TELEPHONE ENCOUNTER
Pt's calling requesting that we fax the form we revised and corrected. To the number listed in prior encounter call and leave detailed messaged with him at [phone] letting him know it was completed. Since the insurance company is saying that his first form which listed he was a smoker which he is not is the correct form.  Thank you,   Patricia Munoz

## 2021-06-18 ENCOUNTER — VIRTUAL VISIT (OUTPATIENT)
Dept: UROLOGY | Facility: CLINIC | Age: 66
End: 2021-06-18
Attending: FAMILY MEDICINE
Payer: COMMERCIAL

## 2021-06-18 VITALS — WEIGHT: 156 LBS | HEIGHT: 67 IN | BODY MASS INDEX: 24.48 KG/M2

## 2021-06-18 DIAGNOSIS — R97.20 ELEVATED PROSTATE SPECIFIC ANTIGEN (PSA): Primary | ICD-10-CM

## 2021-06-18 PROCEDURE — 99204 OFFICE O/P NEW MOD 45 MIN: CPT | Mod: 95 | Performed by: UROLOGY

## 2021-06-18 ASSESSMENT — PAIN SCALES - GENERAL: PAINLEVEL: NO PAIN (0)

## 2021-06-18 ASSESSMENT — MIFFLIN-ST. JEOR: SCORE: 1456.24

## 2021-06-18 NOTE — PROGRESS NOTES
Urology Consult History and Physical  [place]  Name: Francoise Valerio    MRN: [id number]   YOB: 1955       We were asked to see Francoise Valerio at the request of Dr. Parham for evaluation and treatment of Elevated PSA .        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 No pertinent surgical history.         Social History:     Social History     Tobacco Use     Smoking status: Never Smoker     Smokeless tobacco: Never Used   Substance Use Topics     Alcohol use: No       History   Smoking Status     Never Smoker   Smokeless Tobacco     Never Used            Family History:     Family History   Problem Relation Age of Onset     Heart Disease Mother         tachycardia/palpitations              Allergies:     Allergies   Allergen Reactions     Penicillins             Medications:     Current Outpatient Medications   Medication Sig     Ascorbic Acid (VITAMIN C PO) Take by mouth daily     omeprazole (PRILOSEC) 20 MG DR capsule Take 1 capsule (20 mg) by mouth daily (Patient not taking: Reported on 6/18/2021)     No current facility-administered medications for this visit.              Review of Systems:     Skin: negative  Eyes: negative  Ears/Nose/Throat: negative  Respiratory: No shortness of breath, dyspnea on exertion, cough, or hemoptysis  Cardiovascular: negative  Gastrointestinal: negative  Genitourinary: as  "above  Musculoskeletal: negative  Neurologic: negative  Psychiatric: negative  Hematologic/Lymphatic/Immunologic: negative  Endocrine: negative          Physical Exam:     PHYSICAL EXAM  Patient is a 65 year old  male   Vitals: Height 1.702 m (5' 7\"), weight 70.8 kg (156 lb).  Body mass index is 24.43 kg/m .  General Appearance Adult:   Alert, no acute distress, oriented  HENT: throat/mouth:normal, good dentition  Lungs: no respiratory distress, or pursed lip breathing  Heart: No obvious jugular venous distension present  Abdomen: non - distended  Musculoskeltal: extremities normal, no peripheral edema  Skin: no suspicious lesions or rashes  Neuro: Alert, oriented, speech and mentation normal  Psych: affect and mood normal  Gait: Normal           Data:   All laboratory data reviewed:    Component PSA   Latest Ref Rng & Units 0 - 4 ug/L   2/2/2017 5.94 (H)   2/19/2018 5.39 (H)   3/14/2019 6.36 (H)   2/6/2020 7.06 (H)   5/7/2021 9.31 (H)     Lab Results   Component Value Date    CR 0.78 05/07/2021             Impression and Plan:   Impression:   65-year-old man with elevated and rising PSA      Plan:   Elevated and rising PSA  -We discussed the use of PSA as a screening test for prostate cancer  -I reviewed his PSA history and trend which is concerning for a rising PSA since 2017.  His most recent PSA of 9.31 demonstrates a fairly substantial increase from February 2020 when it was 7.06  -We discussed the need for further investigation with a prostate MRI  -We discussed the use of prostate MRI and the biopsy naïve patient population and that this is quickly become the clinical standard of care.  We discussed the growing body of evidence help support this practice as been shown to help increase the diagnostic accuracy of the initial biopsy and decrease the rate of repeat biopsies.  -Order for MRI placed  -At this time this is an undiagnosed problem with an uncertain prognosis  -Virtual visit follow-up with me to " discuss the MRI results     Thank you for the kind consultation.    Chart documentation with Dragon Voice recognition Software. Although reviewed after completion, some words and grammatical errors may remain.     Time spent: 20 minutes spent on the date of the encounter doing chart review, history and exam, documentation and further activities as noted above.    Avila Whaley MD   Urology  [facility]  [facility] Phone: [phone]  [facility] Phone: [phone]       Francoise is a 65 year old who is being evaluated via a billable video visit.      How would you like to obtain your AVS? MyChart  If the video visit is dropped, the invitation should be resent by: Text to cell phone: [phone]  Will anyone else be joining your video visit? No        Video-Visit Details    Type of service:  Video Visit    Video Start Time: 9:00 AM    Video End Time:9:14 AM    Originating Location (pt. Location): Home    Distant Location (provider location):  [facility]     Platform used for Video Visit: [employer]box

## 2021-06-18 NOTE — LETTER
6/18/2021       RE: Francoise Valerio  [address]  [place] [address]     Dear Colleague,    Thank you for referring your patient, Francoise Valerio, to the [facility] at [facility]. Please see a copy of my visit note below.    Urology Consult History and Physical  [place]  Name: Francoise Valerio    MRN: [id number]   YOB: 1955       We were asked to see Francoise Valerio at the request of Dr. Parham for evaluation and treatment of Elevated PSA .        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 No pertinent surgical history.         Social History:     Social History     Tobacco Use     Smoking status: Never Smoker     Smokeless tobacco: Never Used   Substance Use Topics     Alcohol use: No       History   Smoking Status     Never Smoker   Smokeless Tobacco     Never Used            Family History:     Family History   Problem Relation Age of Onset     Heart Disease Mother         tachycardia/palpitations              Allergies:     Allergies   Allergen Reactions     Penicillins             Medications:     Current Outpatient Medications   Medication Sig     Ascorbic Acid (VITAMIN C PO) Take by mouth daily     omeprazole (PRILOSEC) 20 MG DR capsule Take 1 capsule (20 mg) by mouth daily (Patient not taking: Reported on 6/18/2021)     No current  "facility-administered medications for this visit.              Review of Systems:     Skin: negative  Eyes: negative  Ears/Nose/Throat: negative  Respiratory: No shortness of breath, dyspnea on exertion, cough, or hemoptysis  Cardiovascular: negative  Gastrointestinal: negative  Genitourinary: as above  Musculoskeletal: negative  Neurologic: negative  Psychiatric: negative  Hematologic/Lymphatic/Immunologic: negative  Endocrine: negative          Physical Exam:     PHYSICAL EXAM  Patient is a 65 year old  male   Vitals: Height 1.702 m (5' 7\"), weight 70.8 kg (156 lb).  Body mass index is 24.43 kg/m .  General Appearance Adult:   Alert, no acute distress, oriented  HENT: throat/mouth:normal, good dentition  Lungs: no respiratory distress, or pursed lip breathing  Heart: No obvious jugular venous distension present  Abdomen: non - distended  Musculoskeltal: extremities normal, no peripheral edema  Skin: no suspicious lesions or rashes  Neuro: Alert, oriented, speech and mentation normal  Psych: affect and mood normal  Gait: Normal           Data:   All laboratory data reviewed:    Component PSA   Latest Ref Rng & Units 0 - 4 ug/L   2/2/2017 5.94 (H)   2/19/2018 5.39 (H)   3/14/2019 6.36 (H)   2/6/2020 7.06 (H)   5/7/2021 9.31 (H)     Lab Results   Component Value Date    CR 0.78 05/07/2021             Impression and Plan:   Impression:   65-year-old man with elevated and rising PSA      Plan:   Elevated and rising PSA  -We discussed the use of PSA as a screening test for prostate cancer  -I reviewed his PSA history and trend which is concerning for a rising PSA since 2017.  His most recent PSA of 9.31 demonstrates a fairly substantial increase from February 2020 when it was 7.06  -We discussed the need for further investigation with a prostate MRI  -We discussed the use of prostate MRI and the biopsy naïve patient population and that this is quickly become the clinical standard of care.  We discussed the growing body " of evidence help support this practice as been shown to help increase the diagnostic accuracy of the initial biopsy and decrease the rate of repeat biopsies.  -Order for MRI placed  -At this time this is an undiagnosed problem with an uncertain prognosis  -Virtual visit follow-up with me to discuss the MRI results     Thank you for the kind consultation.    Chart documentation with Dragon Voice recognition Software. Although reviewed after completion, some words and grammatical errors may remain.     Time spent: 20 minutes spent on the date of the encounter doing chart review, history and exam, documentation and further activities as noted above.    Avila Whaley MD   Urology  [facility]  [facility] Phone: [phone]  [facility] Phone: [phone]       Francoise is a 65 year old who is being evaluated via a billable video visit.      How would you like to obtain your AVS? MyChart  If the video visit is dropped, the invitation should be resent by: Text to cell phone: [phone]  Will anyone else be joining your video visit? No        Video-Visit Details    Type of service:  Video Visit    Video Start Time: 9:00 AM    Video End Time:9:14 AM    Originating Location (pt. Location): Home    Distant Location (provider location):  [facility]     Platform used for Video Visit: [employer]

## 2021-06-18 NOTE — NURSING NOTE
Chief Complaint   Patient presents with     Elevated PSA     Patient is ready for video visit    Amy Garcia

## 2021-07-23 ENCOUNTER — ANCILLARY PROCEDURE (OUTPATIENT)
Dept: MRI IMAGING | Facility: CLINIC | Age: 66
End: 2021-07-23
Attending: UROLOGY
Payer: COMMERCIAL

## 2021-07-23 DIAGNOSIS — R97.20 ELEVATED PROSTATE SPECIFIC ANTIGEN (PSA): ICD-10-CM

## 2021-07-23 PROCEDURE — 72197 MRI PELVIS W/O & W/DYE: CPT | Performed by: STUDENT IN AN ORGANIZED HEALTH CARE EDUCATION/TRAINING PROGRAM

## 2021-07-23 PROCEDURE — A9585 GADOBUTROL INJECTION: HCPCS | Performed by: STUDENT IN AN ORGANIZED HEALTH CARE EDUCATION/TRAINING PROGRAM

## 2021-07-23 RX ORDER — GADOBUTROL 604.72 MG/ML
7.5 INJECTION INTRAVENOUS ONCE
Status: COMPLETED | OUTPATIENT
Start: 2021-07-23 | End: 2021-07-23

## 2021-07-23 RX ADMIN — GADOBUTROL 7 ML: 604.72 INJECTION INTRAVENOUS at 13:52

## 2021-07-23 NOTE — DISCHARGE INSTRUCTIONS
MRI Contrast Discharge Instructions    The IV contrast you received today will pass out of your body in your  urine. This will happen in the next 24 hours. You will not feel this process.  Your urine will not change color.    Drink at least 4 extra glasses of water or juice today (unless your doctor  has restricted your fluids). This reduces the stress on your kidneys.  You may take your regular medicines.    If you are on dialysis: It is best to have dialysis today.    If you have a reaction: Most reactions happen right away. If you have  any new symptoms after leaving the hospital (such as hives or swelling),  call your hospital at the correct number below. Or call your family doctor.  If you have breathing distress or wheezing, call 911.    Special instructions: ***    I have read and understand the above information.    Signature:______________________________________ Date:___________    Staff:__________________________________________ Date:___________     Time:__________    [facility] Departments:    ___[place]: [phone]  ___[facility]: [phone]  ___[place]: [phone] ___[facility]: [phone]  ___[facility]: [phone]  ___[facility]: [phone]  ___[phone]  ___[facility]: [phone]  ___[phone]

## 2021-07-30 ENCOUNTER — VIRTUAL VISIT (OUTPATIENT)
Dept: UROLOGY | Facility: CLINIC | Age: 66
End: 2021-07-30
Payer: COMMERCIAL

## 2021-07-30 VITALS — HEIGHT: 68 IN | BODY MASS INDEX: 22.73 KG/M2 | WEIGHT: 150 LBS

## 2021-07-30 DIAGNOSIS — R97.20 ELEVATED PROSTATE SPECIFIC ANTIGEN (PSA): Primary | ICD-10-CM

## 2021-07-30 PROCEDURE — 99214 OFFICE O/P EST MOD 30 MIN: CPT | Mod: 95 | Performed by: UROLOGY

## 2021-07-30 RX ORDER — CIPROFLOXACIN 500 MG/1
500 TABLET, FILM COATED ORAL 2 TIMES DAILY
Qty: 6 TABLET | Refills: 0 | Status: SHIPPED | OUTPATIENT
Start: 2021-07-30 | End: 2021-11-02

## 2021-07-30 ASSESSMENT — PAIN SCALES - GENERAL: PAINLEVEL: NO PAIN (0)

## 2021-07-30 ASSESSMENT — MIFFLIN-ST. JEOR: SCORE: 1444.9

## 2021-07-30 NOTE — LETTER
"7/30/2021       RE: Francoise Valerio  [email or web address]  [facility] [address]     Dear Colleague,    Thank you for referring your patient, Francoise Valerio, to the [facility] at [facility]. Please see a copy of my visit note below.    [place]   CHIEF COMPLAINT   It was my pleasure to see Francoise Valerio who is a 65 year old male for follow-up of ELEVATED PSA .      HPI   Francoise Valerio is a very pleasant 65 year old male    Initially seen 6/18/21:  Francoise Valerio is a 65 year old male who is being seen for evaluation of Elevated PSA   No significant LUTS  No other issues  Nocturia 1x/night     No known family history of prostate cancer     TODAY 7/30/21:  MRI went ok  He did read the report and has a lot of questions about the various terminology  Planning a trip to [place] the last few weeks of August.     PHYSICAL EXAM  Patient is a 65 year old  male   Vitals: Height 1.727 m (5' 8\"), weight 68 kg (150 lb).  Body mass index is 22.81 kg/m .  General Appearance Adult:   Alert, no acute distress, oriented  HENT: throat/mouth:normal, good dentition  Lungs: no respiratory distress, or pursed lip breathing  Heart: No obvious jugular venous distension present  Abdomen: non - distended  Musculoskeltal: extremities normal, no peripheral edema  Skin: no suspicious lesions or rashes  Neuro: Alert, oriented, speech and mentation normal  Psych: affect and mood normal  Gait: Normal     Component PSA   Latest Ref Rng & Units 0 - 4 ug/L   2/2/2017 5.94 (H)   2/19/2018 5.39 (H)   3/14/2019 6.36 (H)   2/6/2020 7.06 (H)   5/7/2021 9.31 (H)     IMAGING:  All pertinent imaging reviewed:    All imaging studies reviewed by me.  I personally reviewed these imaging films.  A formal report from radiology will follow.    MRI PROSTATE 7/23/21:  FINDINGS:  Size:  35.7 grams  Hemorrhage: Absent  Peripheral zone: Heterogeneous on T2-weighted images. Suspicious  lesions as detailed " below.  Transition zone: Enlarged with BPH changes. Transition zone nodules  which are circumscribed or mostly encapsulated without diffusion  restriction.  PI-RADS 2.  No highly suspicious nodules.     Lesion(s) in rank order of severity (highest score- to lowest score,  then by size)      Lesion 1:  Location: Right apex peripheral zone at the 10-12 o'clock position  relative to the urethra. Series 14 image 21.  Series 7, image 65  Size: 14 mm  T2 description: Not circumscribed, moderate hypointensity  T2 numerical assessment: 3  DWI description: Focal markedly hypointense on ADC and markedly  hyperintense on high B-value DWI.  DWI numerical assessment: 4  DCE assessment: Positive    Prostate margin: Capsular abutment 6-15 mm  Lesion overall PI-RADS category: 4     Neurovascular bundles: No suspicion of involvement by malignancy  Seminal vesicles: Not involved by tumor.  Lymph nodes: No significant adenopathy. Subcentimeter bilateral  external iliac lymph nodes (series 19, image 26), measuring 4 mm on  the right and 3 mm on the left. Subcentimeter bilateral inguinal lymph  nodes likely reactive  Bones: No suspicious lesions.  Other pelvic organs: No additional findings. Colonic diverticulosis..  Small fat-containing left inguinal hernia.                                            IMPRESSION:  1. Based on the most suspicious abnormality, this exam is  characterized as PIRADS 4 - Clinically significant cancer is likely to  be present.?The most suspicious abnormality is located at the right  apex peripheral zone, the 10:00 to 12:00 position and there is short  segment capsular abutment with low likelihood of minimal  extraprostatic extension.  2. No suspicious adenopathy or evidence of pelvic metastases.    ASSESSMENT and PLAN  65-year-old man with elevated PSA at 9.31 and now with an MRI with a concerning PI-RADS 4 lesion with low likelihood of minimal extraprostatic extension there is no evidence of  adenopathy    Elevated PSA  -I reviewed his PSA history and trend  -I reviewed his MRI results and images personally.  I agree with the radiologist interpretation.  I was able to share the MRI images with the patient via our video connection today.  We discussed the PI-RADS scoring system and that a PI-RADS 4 lesion will correlate with clinically significant prostate cancer about 80% of the time  -TRUS/Bx - we discussed the risks and benefits of the prostate biopsy including the normal intermittent hematuria, blood per rectum, and blood with ejaculation as well as a 1-2% risk of post biopsy sepsis requiring hospitalization and IV antibiotics  -Prescription for ciprofloxacin sent to his pharmacy  -We will plan for a UroNav MR fusion biopsy      Time spent: 30 minutes spent on the date of the encounter doing chart review, history and exam, documentation and further activities as noted above.    Avila Whaley MD   Urology  [facility]  [facility] Phone: [phone]  [facility] Phone: [phone]      Francoise is a 65 year old who is being evaluated via a billable video visit.      How would you like to obtain your AVS? MyChart  If the video visit is dropped, the invitation should be resent by: Text to cell phone: [phone]  Will anyone else be joining your video visit? No        Video-Visit Details    Type of service:  Video Visit    Video Start Time: 4:30 PM    Video End Time:5:00    Originating Location (pt. Location): Home    Distant Location (provider location):  [facility]     Platform used for Video Visit: WinProbe

## 2021-07-30 NOTE — PROGRESS NOTES
"[facility]   CHIEF COMPLAINT   It was my pleasure to see Francoise Valerio who is a 65 year old male for follow-up of ELEVATED PSA .      HPI   Francoise Valerio is a very pleasant 65 year old male    Initially seen 6/18/21:  Francoise Valerio is a 65 year old male who is being seen for evaluation of Elevated PSA   No significant LUTS  No other issues  Nocturia 1x/night     No known family history of prostate cancer     TODAY 7/30/21:  MRI went ok  He did read the report and has a lot of questions about the various terminology  Planning a trip to [place] the last few weeks of August.     PHYSICAL EXAM  Patient is a 65 year old  male   Vitals: Height 1.727 m (5' 8\"), weight 68 kg (150 lb).  Body mass index is 22.81 kg/m .  General Appearance Adult:   Alert, no acute distress, oriented  HENT: throat/mouth:normal, good dentition  Lungs: no respiratory distress, or pursed lip breathing  Heart: No obvious jugular venous distension present  Abdomen: non - distended  Musculoskeltal: extremities normal, no peripheral edema  Skin: no suspicious lesions or rashes  Neuro: Alert, oriented, speech and mentation normal  Psych: affect and mood normal  Gait: Normal     Component PSA   Latest Ref Rng & Units 0 - 4 ug/L   2/2/2017 5.94 (H)   2/19/2018 5.39 (H)   3/14/2019 6.36 (H)   2/6/2020 7.06 (H)   5/7/2021 9.31 (H)     IMAGING:  All pertinent imaging reviewed:    All imaging studies reviewed by me.  I personally reviewed these imaging films.  A formal report from radiology will follow.    MRI PROSTATE 7/23/21:  FINDINGS:  Size:  35.7 grams  Hemorrhage: Absent  Peripheral zone: Heterogeneous on T2-weighted images. Suspicious  lesions as detailed below.  Transition zone: Enlarged with BPH changes. Transition zone nodules  which are circumscribed or mostly encapsulated without diffusion  restriction.  PI-RADS 2.  No highly suspicious nodules.     Lesion(s) in rank order of severity (highest score- to lowest score,  then by size)      Lesion " 1:  Location: Right apex peripheral zone at the 10-12 o'clock position  relative to the urethra. Series 14 image 21.  Series 7, image 65  Size: 14 mm  T2 description: Not circumscribed, moderate hypointensity  T2 numerical assessment: 3  DWI description: Focal markedly hypointense on ADC and markedly  hyperintense on high B-value DWI.  DWI numerical assessment: 4  DCE assessment: Positive    Prostate margin: Capsular abutment 6-15 mm  Lesion overall PI-RADS category: 4     Neurovascular bundles: No suspicion of involvement by malignancy  Seminal vesicles: Not involved by tumor.  Lymph nodes: No significant adenopathy. Subcentimeter bilateral  external iliac lymph nodes (series 19, image 26), measuring 4 mm on  the right and 3 mm on the left. Subcentimeter bilateral inguinal lymph  nodes likely reactive  Bones: No suspicious lesions.  Other pelvic organs: No additional findings. Colonic diverticulosis..  Small fat-containing left inguinal hernia.                                            IMPRESSION:  1. Based on the most suspicious abnormality, this exam is  characterized as PIRADS 4 - Clinically significant cancer is likely to  be present.?The most suspicious abnormality is located at the right  apex peripheral zone, the 10:00 to 12:00 position and there is short  segment capsular abutment with low likelihood of minimal  extraprostatic extension.  2. No suspicious adenopathy or evidence of pelvic metastases.    ASSESSMENT and PLAN  65-year-old man with elevated PSA at 9.31 and now with an MRI with a concerning PI-RADS 4 lesion with low likelihood of minimal extraprostatic extension there is no evidence of adenopathy    Elevated PSA  -I reviewed his PSA history and trend  -I reviewed his MRI results and images personally.  I agree with the radiologist interpretation.  I was able to share the MRI images with the patient via our video connection today.  We discussed the PI-RADS scoring system and that a PI-RADS 4  lesion will correlate with clinically significant prostate cancer about 80% of the time  -TRUS/Bx - we discussed the risks and benefits of the prostate biopsy including the normal intermittent hematuria, blood per rectum, and blood with ejaculation as well as a 1-2% risk of post biopsy sepsis requiring hospitalization and IV antibiotics  -Prescription for ciprofloxacin sent to his pharmacy  -We will plan for a UroNav MR fusion biopsy      Time spent: 30 minutes spent on the date of the encounter doing chart review, history and exam, documentation and further activities as noted above.    Avila Whaley MD   Urology  [facility]  [facility] Phone: [phone]  [facility] Phone: [phone]      Francoise is a 65 year old who is being evaluated via a billable video visit.      How would you like to obtain your AVS? MyChart  If the video visit is dropped, the invitation should be resent by: Text to cell phone: [phone]  Will anyone else be joining your video visit? No        Video-Visit Details    Type of service:  Video Visit    Video Start Time: 4:30 PM    Video End Time:5:00    Originating Location (pt. Location): Home    Distant Location (provider location):  [facility]     Platform used for Video Visit: Socrata

## 2021-08-03 NOTE — PATIENT INSTRUCTIONS
Ultrasound Guided Prostate Biopsy    What is a prostate biopsy? A prostate biopsy is a relatively painless procedure performed in the physician's office, outpatient facility or hospital.  A long, thin needle is inserted into the prostate to collect a sample of tissue from the prostate.  These samples are then examined by a pathologist for abnormal cells.    Why do I need a prostate biopsy? During a man's lifetime the prostate gradually increases in size.  The patient may or may not experience symptoms.  Symptoms of an enlarged prostate include:    Increased frequency of urination    Decreased force of urinary stream    Trouble with urination    Awakening at night to urinate    When the prostate is examined, the physician may feel a nodule (hard or firm growth) which would require a biopsy.    Another reason for having a prostate biopsy is an increase in the prostate specific androgen (PSA) in your blood.  A prostate biopsy may be necessary to determine the cause of the increased PSA.    Your physician will also perform an ultrasound (an image created by sound waves).  The ultrasound is performed by placing a small probe in the rectum to image the prostate gland.    Preparation for the Prostate Biopsy    1. Blood thinning medications must be stopped prior to your biopsy.  Please stop the following medication the appropriate number of days before:  a. 10 days-acetylsalicylic acid, vitamin E, fish oil, aspirin, baby aspirin  b. 7 days- Plavix, Brilinta, ibuprofen (Advil, Motrin, Aleve)  c. 5 days-Pradaxa  d. 4 days-Coumadin/warfarin  e. 3 days-Eliquis, Xarelto    2.  If you have any bleeding problems (thin blood), please tell your doctor.    3.  If you have been told by another doctor to take antibiotics before dental work or surgery, please tell the doctor.  This is common for patients that have had a joint replacement.    YOU HAVE BEEN PRESCRIBED AN ANTIBIOTIC.  Please follow the directions written on the bottle.   The directions usually will tell you to start the antibiotic the day before your biopsy.  You will continue taking the medication until it is gone.    The day of the biopsy you will be asked to use an enema one hour before you leave for your appointment.    PLEASE EAT YOUR REGULAR MEALS ON THE DAY OF YOUR BIOPSY.    Unless you have been prescribed a sedative (Valium or a similar drug) you will be able to drive to and from your appointment.  If you have taken a sedative you must have a ride.    AFTER THE BIOPSY    DANGER SIGNS    Small amount of blood in the urine for 10-14 days Excessive blood in the urine, stool or ejaculate  Small amount of blood in the stool for 48 hours Fever over 100 or chills  Small amount of blood in the ejaculate for up to Frequent urination or burning when you urinate   4 weeks          CALL THE DOCTOR'S OFFICE -[phone] IF YOU HAVE ANY OF THESE SYMPTOMS.  IF YOU CANNOT CONTACT THE OFFICE, GO TO THE EMERGENCY ROOM.          Your biopsy is scheduled on___9/10/21______ at our [place] office.  Please be at the office at    __2:00pm______.      A follow up visit has been scheduled for you on ____9/22/21______@___4:15pm_____ as a virtual     visit. Your doctor will discuss your results with you at this visit

## 2021-09-10 ENCOUNTER — OFFICE VISIT (OUTPATIENT)
Dept: UROLOGY | Facility: CLINIC | Age: 66
End: 2021-09-10
Payer: COMMERCIAL

## 2021-09-10 ENCOUNTER — ALLIED HEALTH/NURSE VISIT (OUTPATIENT)
Dept: UROLOGY | Facility: CLINIC | Age: 66
End: 2021-09-10
Payer: COMMERCIAL

## 2021-09-10 VITALS
OXYGEN SATURATION: 99 % | WEIGHT: 150 LBS | SYSTOLIC BLOOD PRESSURE: 140 MMHG | HEART RATE: 89 BPM | BODY MASS INDEX: 23.54 KG/M2 | DIASTOLIC BLOOD PRESSURE: 78 MMHG | HEIGHT: 67 IN

## 2021-09-10 DIAGNOSIS — R97.20 ELEVATED PROSTATE SPECIFIC ANTIGEN (PSA): Primary | ICD-10-CM

## 2021-09-10 DIAGNOSIS — R97.20 ELEVATED PROSTATE SPECIFIC ANTIGEN (PSA): ICD-10-CM

## 2021-09-10 PROCEDURE — 55700 PR BIOPSY OF PROSTATE,NEEDLE/PUNCH: CPT | Performed by: UROLOGY

## 2021-09-10 PROCEDURE — 88342 IMHCHEM/IMCYTCHM 1ST ANTB: CPT | Performed by: PATHOLOGY

## 2021-09-10 PROCEDURE — 76942 ECHO GUIDE FOR BIOPSY: CPT | Performed by: UROLOGY

## 2021-09-10 PROCEDURE — 88305 TISSUE EXAM BY PATHOLOGIST: CPT | Performed by: PATHOLOGY

## 2021-09-10 PROCEDURE — 88341 IMHCHEM/IMCYTCHM EA ADD ANTB: CPT | Performed by: PATHOLOGY

## 2021-09-10 ASSESSMENT — PAIN SCALES - GENERAL: PAINLEVEL: NO PAIN (0)

## 2021-09-10 ASSESSMENT — MIFFLIN-ST. JEOR: SCORE: 1424.03

## 2021-09-10 NOTE — PATIENT INSTRUCTIONS
[facility]  Transrectal Ultrasound  Post Operative Information    The physician who performed your Transrectal Ultrasound is   (telephone number [phone]).  Please contact this doctor if you have any problems or questions.  If unable to reach your doctor, please return to the Emergency Department.      Take one antibiotic the evening of the procedure and then as directed on your prescription.    Drink at least 6-8 glasses of fluids for the first 48 hours.    Avoid heavy lifting and strenuous activity for 48 hours.    Avoid sexual intercourse for the first 24 hours.    No aspirin or ibuprofen products (Motrin, Advil, Nuprin, ect.) for one week.  You may take acetaminophen (Tylenol) for pain.    You may notice a small amount of blood on the tissue after a bowel movement.    You may pass blood with clots in your urine following the procedure.  The amount will decrease with time but may be visible for up to two weeks.     You make have blood in your semen for 4 weeks after the procedure.    You may experience mild perineal (groin area) discomfort after the procedure.    Please call you doctor if you have any of the follow symptoms:  Fever  Increase in the amount of blood passed  Severe discomfort or pain

## 2021-09-10 NOTE — NURSING NOTE
"Chief Complaint   Patient presents with     Elevated PSA     Patient here today for MRI- Transrectal Ultrasound Guided Prostate Biopsy        Blood pressure (!) 140/78, pulse 89, height 1.702 m (5' 7\"), weight 68 kg (150 lb), SpO2 99 %. Body mass index is 23.49 kg/m .    Patient Active Problem List   Diagnosis     CARDIOVASCULAR SCREENING; LDL GOAL LESS THAN 160     Relapsing polychondritis     Advanced directives, counseling/discussion     Vitamin D deficiency     Prediabetes     Elevated prostate specific antigen (PSA)       Allergies   Allergen Reactions     Penicillins        Current Outpatient Medications   Medication Sig Dispense Refill     Ascorbic Acid (VITAMIN C PO) Take by mouth daily        ciprofloxacin (CIPRO) 500 MG tablet Take 1 tablet (500 mg) by mouth 2 times daily Start taking the day before your prostate biopsy. 6 tablet 0     omeprazole (PRILOSEC) 20 MG DR capsule Take 1 capsule (20 mg) by mouth daily (Patient not taking: Reported on 9/10/2021) 30 capsule 1       Social History     Tobacco Use     Smoking status: Never Smoker     Smokeless tobacco: Never Used   Substance Use Topics     Alcohol use: No     Drug use: No       Procedure was explained to patient prior to performing said procedure. The patient signed the consent form and all questions were answered prior to the procedure. Any pre-procedural antibiotics were given according to the performing physicians recommendation. Pt's information was confirmed on samples and samples were sent for analysis. [facility] reviewed information on labels sent with patient and confirmed the accuracy of all the labels.    Consent read and signed: Yes     Allergies   Allergen Reactions     Penicillins      Performing Physician: Dr. Whaley  Antibiotic taken?  YES  Aspirin or other blood thinning medications discontinued 7-10 days:  Yes  Time of Fleet's enema:  Yes  Patient given Gentamicin intramuscular injection 30 minutes prior to procedure Yes    12 samples " were taken from the right and left, medial and lateral base, mid, and apex of the prostate respectively. YES additional samples were taken.. Vitals were repeated prior to patient leaving and instructions for post  MRI Transrectal Ultrasound-Guided Prostate Biopsy care were explained to the patient.    The following medication was given:     MEDICATION:  Lidocaine 1% Soln  ROUTE: RECTAL  SITE: PROSTATE  DOSE:15ML  LOT #: -[id number]  : Hospira  EXPIRATION DATE: 03/01/2022  NDC#: 1623-4502-21  Was there drug waste? Yes  Amount of drug waste (mL): 5ML.  Reason for waste:  Single use vial  Multi-dose vial: JEREMY Heredia  9/10/2021  3:48 PM

## 2021-09-10 NOTE — LETTER
9/10/2021       RE: Francoise Valerio  [address]  [facility] [address]     Dear Colleague,    Thank you for referring your patient, Francoise Valerio, to the [facility] UROLOGY CLINIC [place] at [facility]. Please see a copy of my visit note below.    PHYSICIANS NOTE: TRANSRECTAL ULTRASOUND AND BIOPSY PROCEDURE: 9/10/2021   MAGI     Sign In   History and Physical Exam reviewed and is unchanged.     Primary Diagnosis: Elevated psa (primary encounter diagnosis)   Prostate cancer     Informed Consent Discussed: Yes. Risks, benefits, alternatives and personnel discussed with patient who consents to proceed.     Sign in Communication: Completed   Time Out: Team Confirms the Correct Patient,   Correct Procedure; Transrectal Ultrasound and Biopsy, Correct Site and Site Marking (not applicable), Correct Position.   Affirmation of Time Out: YES   Sign Out: Sign Out Discussion: Completed   Patient history and ROS confirmed unchanged from last office visit.   Family history for prostate cancer is: unknown   Audible Time Out: Yes     TRUS PROCEDURE WITH BIOPSY - URONAV MR FUSION    The patient was placed in the lateral decubitus position.     Digital rectal exam was normal.     The ultrasound probe was placed into the rectum and the prostate visualized.   Approximately five cc plain lidocaine was injected bilaterally into the perioprostatic nerves.     The prostate was visualized in both planes and no hypoechoic lesion identified.     The total prostate volume was 38.5 gm     The patient underwent biopsy removing 16 total cores. 4 from the target lesion and 12 standard core biopsy.     PSA   Date Value Ref Range Status   05/07/2021 9.31 (H) 0 - 4 ug/L Final     Comment:     Assay Method:  Chemiluminescence using Siemens [place] analyzer     MRI PROSTATE 7/23/21:  FINDINGS:  Size:  35.7 grams  Hemorrhage: Absent  Peripheral zone: Heterogeneous on T2-weighted images.  Suspicious  lesions as detailed below.  Transition zone: Enlarged with BPH changes. Transition zone nodules  which are circumscribed or mostly encapsulated without diffusion  restriction.  PI-RADS 2.  No highly suspicious nodules.     Lesion(s) in rank order of severity (highest score- to lowest score,  then by size)      Lesion 1:  Location: Right apex peripheral zone at the 10-12 o'clock position  relative to the urethra. Series 14 image 21.  Series 7, image 65  Size: 14 mm  T2 description: Not circumscribed, moderate hypointensity  T2 numerical assessment: 3  DWI description: Focal markedly hypointense on ADC and markedly  hyperintense on high B-value DWI.  DWI numerical assessment: 4  DCE assessment: Positive    Prostate margin: Capsular abutment 6-15 mm  Lesion overall PI-RADS category: 4     Neurovascular bundles: No suspicion of involvement by malignancy  Seminal vesicles: Not involved by tumor.  Lymph nodes: No significant adenopathy. Subcentimeter bilateral  external iliac lymph nodes (series 19, image 26), measuring 4 mm on  the right and 3 mm on the left. Subcentimeter bilateral inguinal lymph  nodes likely reactive  Bones: No suspicious lesions.  Other pelvic organs: No additional findings. Colonic diverticulosis..  Small fat-containing left inguinal hernia.                                            IMPRESSION:  1. Based on the most suspicious abnormality, this exam is  characterized as PIRADS 4 - Clinically significant cancer is likely to  be present.?The most suspicious abnormality is located at the right  apex peripheral zone, the 10:00 to 12:00 position and there is short  segment capsular abutment with low likelihood of minimal  extraprostatic extension.  2. No suspicious adenopathy or evidence of pelvic metastases.  ----------     Complications: None     Follow-up: We will CONTACT the patient.    Avila Whaley MD

## 2021-09-10 NOTE — PROGRESS NOTES
PHYSICIANS NOTE: TRANSRECTAL ULTRASOUND AND BIOPSY PROCEDURE: 9/10/2021   [facility]     Sign In   History and Physical Exam reviewed and is unchanged.     Primary Diagnosis: Elevated psa (primary encounter diagnosis)   Prostate cancer     Informed Consent Discussed: Yes. Risks, benefits, alternatives and personnel discussed with patient who consents to proceed.     Sign in Communication: Completed   Time Out: Team Confirms the Correct Patient,   Correct Procedure; Transrectal Ultrasound and Biopsy, Correct Site and Site Marking (not applicable), Correct Position.   Affirmation of Time Out: YES   Sign Out: Sign Out Discussion: Completed   Patient history and ROS confirmed unchanged from last office visit.   Family history for prostate cancer is: unknown   Audible Time Out: Yes     TRUS PROCEDURE WITH BIOPSY - URONAV MR FUSION    The patient was placed in the lateral decubitus position.     Digital rectal exam was normal.     The ultrasound probe was placed into the rectum and the prostate visualized.   Approximately five cc plain lidocaine was injected bilaterally into the perioprostatic nerves.     The prostate was visualized in both planes and no hypoechoic lesion identified.     The total prostate volume was 38.5 gm     The patient underwent biopsy removing 16 total cores. 4 from the target lesion and 12 standard core biopsy.     PSA   Date Value Ref Range Status   05/07/2021 9.31 (H) 0 - 4 ug/L Final     Comment:     Assay Method:  Chemiluminescence using Siemens [place] analyzer     MRI PROSTATE 7/23/21:  FINDINGS:  Size:  35.7 grams  Hemorrhage: Absent  Peripheral zone: Heterogeneous on T2-weighted images. Suspicious  lesions as detailed below.  Transition zone: Enlarged with BPH changes. Transition zone nodules  which are circumscribed or mostly encapsulated without diffusion  restriction.  PI-RADS 2.  No highly suspicious nodules.     Lesion(s) in rank order of severity (highest score- to lowest score,  then by  size)      Lesion 1:  Location: Right apex peripheral zone at the 10-12 o'clock position  relative to the urethra. Series 14 image 21.  Series 7, image 65  Size: 14 mm  T2 description: Not circumscribed, moderate hypointensity  T2 numerical assessment: 3  DWI description: Focal markedly hypointense on ADC and markedly  hyperintense on high B-value DWI.  DWI numerical assessment: 4  DCE assessment: Positive    Prostate margin: Capsular abutment 6-15 mm  Lesion overall PI-RADS category: 4     Neurovascular bundles: No suspicion of involvement by malignancy  Seminal vesicles: Not involved by tumor.  Lymph nodes: No significant adenopathy. Subcentimeter bilateral  external iliac lymph nodes (series 19, image 26), measuring 4 mm on  the right and 3 mm on the left. Subcentimeter bilateral inguinal lymph  nodes likely reactive  Bones: No suspicious lesions.  Other pelvic organs: No additional findings. Colonic diverticulosis..  Small fat-containing left inguinal hernia.                                            IMPRESSION:  1. Based on the most suspicious abnormality, this exam is  characterized as PIRADS 4 - Clinically significant cancer is likely to  be present.?The most suspicious abnormality is located at the right  apex peripheral zone, the 10:00 to 12:00 position and there is short  segment capsular abutment with low likelihood of minimal  extraprostatic extension.  2. No suspicious adenopathy or evidence of pelvic metastases.  ----------     Complications: None     Follow-up: We will CONTACT the patient.    Avila Whaley MD

## 2021-09-15 LAB
PATH REPORT.COMMENTS IMP SPEC: NORMAL
PATH REPORT.COMMENTS IMP SPEC: NORMAL
PATH REPORT.FINAL DX SPEC: NORMAL
PATH REPORT.GROSS SPEC: NORMAL
PATH REPORT.MICROSCOPIC SPEC OTHER STN: NORMAL
PATH REPORT.RELEVANT HX SPEC: NORMAL
PHOTO IMAGE: NORMAL

## 2021-09-22 ENCOUNTER — VIRTUAL VISIT (OUTPATIENT)
Dept: UROLOGY | Facility: CLINIC | Age: 66
End: 2021-09-22
Payer: COMMERCIAL

## 2021-09-22 DIAGNOSIS — C61 PROSTATE CANCER (H): Primary | ICD-10-CM

## 2021-09-22 PROCEDURE — 99214 OFFICE O/P EST MOD 30 MIN: CPT | Mod: 95 | Performed by: UROLOGY

## 2021-09-22 NOTE — LETTER
9/22/2021       RE: Francoise Valerio  [address]  [facility] [address]     Dear Colleague,    Thank you for referring your patient, Francoise Valerio, to the [facility] at [facility]. Please see a copy of my visit note below.    [place]   CHIEF COMPLAINT   It was my pleasure to see Francoise Valerio who is a 65 year old male for follow-up of ELEVATED PSA .      HPI   Francoise Valerio is a very pleasant 66 year old male    Initially seen 6/18/21:  Francoise Valerio is a 65 year old male who is being seen for evaluation of Elevated PSA   No significant LUTS  No other issues  Nocturia 1x/night     No known family history of prostate cancer     7/30/21:  MRI went ok  He did read the report and has a lot of questions about the various terminology  Planning a trip to [place] the last few weeks of August.     9/10/21:  MR-fusion UroNav biopsy    TODAY 9/22/21:  Follow up to discuss biopsy results  No complications after the biopsy    PHYSICAL EXAM  Patient is a 66 year old  male   Vitals: There were no vitals taken for this visit.  There is no height or weight on file to calculate BMI.  General Appearance Adult:   Alert, no acute distress, oriented  HENT: throat/mouth:normal, good dentition  Lungs: no respiratory distress, or pursed lip breathing  Heart: No obvious jugular venous distension present  Abdomen: non - distended  Musculoskeltal: extremities normal, no peripheral edema  Skin: no suspicious lesions or rashes  Neuro: Alert, oriented, speech and mentation normal  Psych: affect and mood normal  Gait: Normal     Component PSA   Latest Ref Rng & Units 0 - 4 ug/L   2/2/2017 5.94 (H)   2/19/2018 5.39 (H)   3/14/2019 6.36 (H)   2/6/2020 7.06 (H)   5/7/2021 9.31 (H)     Final Diagnosis   A(A). Prostate, LEFT LATERAL BASE, Prostate needle biopsy:  - Benign prostatic tissue. Negative for malignancy.     B(B). Prostate, LEFT LATERAL MID, Prostate needle biopsy:  -  Benign prostatic tissue. Negative for malignancy.     C(C). Prostate, LEFT LATERAL APEX, Prostate needle biopsy:  - Benign prostatic tissue. Negative for malignancy.     D(D). Prostate, LEFT BASE, Prostate needle biopsy:  - Benign prostatic tissue. Negative for malignancy.     E(E). Prostate, LEFT MID, Prostate needle biopsy:  - Benign prostatic tissue. Negative for malignancy.     F(F). Prostate, LEFT APEX, Prostate needle biopsy:  -High-grade prostatic intraepithelial neoplasia.  Negative for invasive malignancy     G(G). Prostate, RIGHT LATERAL BASE, Prostate needle biopsy:  - Benign prostatic tissue. Negative for malignancy.     H(H). Prostate, RIGHT LATERAL MID, Prostate needle biopsy:  - Benign prostatic tissue. Negative for malignancy.     I(I). Prostate, RIGHT LATERAL APEX, Prostate needle biopsy:  - - Prostatic adenocarcinoma, acinar type, Cullom's grade 3+4=7, grade group is 2, number of cores involved is 1 of 1, % of grade 4 is <10 %,total surface area involved is 10-15 %,perineural invasion is not identified     J(J). Prostate, RIGHT BASE, Prostate needle biopsy:  -Benign prostatic tissue.  Negative for malignancy.     K(K). Prostate, RIGHT MID, Prostate needle biopsy:  -Focal high-grade prostatic intraepithelial neoplasia.  Negative for invasive malignancy.     L(L). Prostate, RIGHT APEX, Prostate needle biopsy:  - Prostatic adenocarcinoma, acinar type, Cullom's grade 3+4=7, grade group is 2, number of cores involved is 1 of 1, % of grade 4 is 40 %,total surface area involved is 80 %,perineural invasion is  identified     M(M). Prostate, RIGHT LESION (X4), Prostate needle biopsy:  - Prostatic adenocarcinoma, acinar type, Cuca's grade 3+4=7, grade group is 2, number of cores involved is 3 of 4, % of grade 4 is 30 %,total surface area involved is 30%,perineural invasion is  identified     IMAGING:  All pertinent imaging reviewed:    All imaging studies reviewed by me.  I personally reviewed these  imaging films.  A formal report from radiology will follow.    MRI PROSTATE 7/23/21:  FINDINGS:  Size:  35.7 grams  Hemorrhage: Absent  Peripheral zone: Heterogeneous on T2-weighted images. Suspicious  lesions as detailed below.  Transition zone: Enlarged with BPH changes. Transition zone nodules  which are circumscribed or mostly encapsulated without diffusion  restriction.  PI-RADS 2.  No highly suspicious nodules.     Lesion(s) in rank order of severity (highest score- to lowest score,  then by size)      Lesion 1:  Location: Right apex peripheral zone at the 10-12 o'clock position  relative to the urethra. Series 14 image 21.  Series 7, image 65  Size: 14 mm  T2 description: Not circumscribed, moderate hypointensity  T2 numerical assessment: 3  DWI description: Focal markedly hypointense on ADC and markedly  hyperintense on high B-value DWI.  DWI numerical assessment: 4  DCE assessment: Positive    Prostate margin: Capsular abutment 6-15 mm  Lesion overall PI-RADS category: 4     Neurovascular bundles: No suspicion of involvement by malignancy  Seminal vesicles: Not involved by tumor.  Lymph nodes: No significant adenopathy. Subcentimeter bilateral  external iliac lymph nodes (series 19, image 26), measuring 4 mm on  the right and 3 mm on the left. Subcentimeter bilateral inguinal lymph  nodes likely reactive  Bones: No suspicious lesions.  Other pelvic organs: No additional findings. Colonic diverticulosis..  Small fat-containing left inguinal hernia.                                            IMPRESSION:  1. Based on the most suspicious abnormality, this exam is  characterized as PIRADS 4 - Clinically significant cancer is likely to  be present.?The most suspicious abnormality is located at the right  apex peripheral zone, the 10:00 to 12:00 position and there is short  segment capsular abutment with low likelihood of minimal  extraprostatic extension.  2. No suspicious adenopathy or evidence of pelvic  metastases.    ASSESSMENT and PLAN  65-year-old man with elevated PSA at 9.31 and now with an MRI with a concerning PI-RADS 4 lesion with low likelihood of minimal extraprostatic extension there is no evidence of adenopathy    MSK Nomogram:  Cancer-specific survival after RALP:  99% (10 years) 99%(15 years)  Progression-free % after RALP:  84% (5 years)  73% (10 years)  Organ-confined disease:   60%  Extra-prostatic extension:   38%  Lymph node involvement:   3%  Seminal Vesicle invasion:   3%    DISCUSSION PROSTATE CANCER     The natural history of this disease was explained to the patient at length and the treatment options discussed including radical prostatectomy by open or robotic-assisted laparoscopic approach, external-beam radiotherapy, brachytherapy, active surveillance and watchful waiting, including the probability of success and complications associated with these approaches.    With respect to watchful waiting, we discussed the difficulties of estimating the extent of disease preoperatively, the risk of cancer progression, and risk that salvage might not be possible with progression. However, the favorable 10-year outcomes of active surveillance in appropriately selected patients was conveyed.  We discussed that given his Cuca 3+4 = 7 prostate cancer he would be at higher risk for progression while on active surveillance.  This would still technically be an option and would require genetic testing.  He is not interested in this option.    With respect to seed implants, we discussed risks including but not limited to cancer recurrence, exacerbation of voiding symptoms or hematuria, urinary retention, induction of 2nd malignancy, and risks of rectal symptoms or bleeding.  We also discussed risks of the anesthesia including but not limited to MI, CVA, DVT, and PE.      With respect to EBRT, we discussed we discussed risks including but not limited to cancer recurrence, exacerbation of voiding symptoms  or hematuria, urinary retention, incontinence, stricture of the urinary tract, induction of 2nd malignancy, and risks of rectal symptoms or bleeding.  We discussed fact that rectal symptoms may be more common with EBRT than with other treatment modalities.     With radiation therapy, we discussed the difficulties in diagnosing recurrent disease at an early stage due to variability in PSA levels and the fact that most patients are not candidates for local salvage therapy when biochemical recurrence is declared.  We also discussed the significant morbidity of local salvage therapy in terms of perioperative complications, erectile dysfunction and urinary incontinence.    With respect to radical prostatectomy, the pros and cons of open vs robotic-assisted laparoscopic prostatectomy were discussed. The complications of this procedure were reviewed with the patient and include (but not limited to) urinary incontinence, erectile dysfunction, infertility, anastomotic stricture, lymphocele, hemorrhage requiring transfusion, rectal, ureteral, or nerve injury, infection, cardiovascular, pulmonary, thromboembolic, and anesthetic complications.  For robotic prostatectomy, the additional complications of anastomotic urine leak and small bowel obstruction from adhesions was conveyed. The anticipated post-operative course was explained, including an anticipated 1 night hospital stay.  We will plan for bilateral nerve sparing    With surgery, we also discussed the potential advantage over radiation therapy in that biochemical recurrence can be detected at a relatively earlier stage and that salvage radiotherapy is successful in controlling recurrent disease in a substantial proportion of patients.  I also conveyed that salvage radiotherapy was associated with a considerably more favorable morbidity profile compared to local salvage therapies for radiorecurent disease.     All questions were answered.      Patient has decided he  would like to proceed with Robotic Radical Prostatectomy - [id number]    The risks, benefits, alternatives, and personnel involved in the procudure were discussed.  All questions were answered.  A written informed consent will be finalized on the morning of the procedure.    Plan:  -Orders for surgery placed    Time spent: 30 minutes spent on the date of the encounter doing chart review, history and exam, documentation and further activities as noted above.    Avila Whaley MD   Urology  [facility]  [facility] Phone: [phone]  [facility] Phone: [phone]    Francoise is a 66 year old who is being evaluated via a billable video visit.      How would you like to obtain your AVS? Mail a copy  If the video visit is dropped, the invitation should be resent by: Text to cell phone: [phone]  Will anyone else be joining your video visit? No      Video-Visit Details    Type of service:  Video Visit    Video Start Time: 4:14 PM    Video End Time:4:36 PM    Originating Location (pt. Location): Home    Distant Location (provider location):  [facility]     Platform used for Video Visit: [facility]

## 2021-09-22 NOTE — Clinical Note
Thierno Kelley,    I followed up with Francoise today regarding his prostate biopsy results.  Unfortunately, this did show Cuca 3+4 = 7 prostate cancer.  We discussed treatment options and he would like to proceed with a robotic prostatectomy.    Please let me know if you have any questions or concerns.    Thanks,   Avila Whaley M.D.  Cell: [phone]

## 2021-09-22 NOTE — PROGRESS NOTES
MAGI   CHIEF COMPLAINT   It was my pleasure to see Francoise Valerio who is a 65 year old male for follow-up of ELEVATED PSA .      HPI   Francoise Valerio is a very pleasant 66 year old male    Initially seen 6/18/21:  Francoise Valerio is a 65 year old male who is being seen for evaluation of Elevated PSA   No significant LUTS  No other issues  Nocturia 1x/night     No known family history of prostate cancer     7/30/21:  MRI went ok  He did read the report and has a lot of questions about the various terminology  Planning a trip to [place] the last few weeks of August.     9/10/21:  MR-fusion UroNav biopsy    TODAY 9/22/21:  Follow up to discuss biopsy results  No complications after the biopsy    PHYSICAL EXAM  Patient is a 66 year old  male   Vitals: There were no vitals taken for this visit.  There is no height or weight on file to calculate BMI.  General Appearance Adult:   Alert, no acute distress, oriented  HENT: throat/mouth:normal, good dentition  Lungs: no respiratory distress, or pursed lip breathing  Heart: No obvious jugular venous distension present  Abdomen: non - distended  Musculoskeltal: extremities normal, no peripheral edema  Skin: no suspicious lesions or rashes  Neuro: Alert, oriented, speech and mentation normal  Psych: affect and mood normal  Gait: Normal     Component PSA   Latest Ref Rng & Units 0 - 4 ug/L   2/2/2017 5.94 (H)   2/19/2018 5.39 (H)   3/14/2019 6.36 (H)   2/6/2020 7.06 (H)   5/7/2021 9.31 (H)     Final Diagnosis   A(A). Prostate, LEFT LATERAL BASE, Prostate needle biopsy:  - Benign prostatic tissue. Negative for malignancy.     B(B). Prostate, LEFT LATERAL MID, Prostate needle biopsy:  - Benign prostatic tissue. Negative for malignancy.     C(C). Prostate, LEFT LATERAL APEX, Prostate needle biopsy:  - Benign prostatic tissue. Negative for malignancy.     D(D). Prostate, LEFT BASE, Prostate needle biopsy:  - Benign prostatic tissue. Negative for malignancy.     E(E). Prostate, LEFT  MID, Prostate needle biopsy:  - Benign prostatic tissue. Negative for malignancy.     F(F). Prostate, LEFT APEX, Prostate needle biopsy:  -High-grade prostatic intraepithelial neoplasia.  Negative for invasive malignancy     G(G). Prostate, RIGHT LATERAL BASE, Prostate needle biopsy:  - Benign prostatic tissue. Negative for malignancy.     H(H). Prostate, RIGHT LATERAL MID, Prostate needle biopsy:  - Benign prostatic tissue. Negative for malignancy.     I(I). Prostate, RIGHT LATERAL APEX, Prostate needle biopsy:  - - Prostatic adenocarcinoma, acinar type, Lemhi's grade 3+4=7, grade group is 2, number of cores involved is 1 of 1, % of grade 4 is <10 %,total surface area involved is 10-15 %,perineural invasion is not identified     J(J). Prostate, RIGHT BASE, Prostate needle biopsy:  -Benign prostatic tissue.  Negative for malignancy.     K(K). Prostate, RIGHT MID, Prostate needle biopsy:  -Focal high-grade prostatic intraepithelial neoplasia.  Negative for invasive malignancy.     L(L). Prostate, RIGHT APEX, Prostate needle biopsy:  - Prostatic adenocarcinoma, acinar type, Lemhi's grade 3+4=7, grade group is 2, number of cores involved is 1 of 1, % of grade 4 is 40 %,total surface area involved is 80 %,perineural invasion is  identified     M(M). Prostate, RIGHT LESION (X4), Prostate needle biopsy:  - Prostatic adenocarcinoma, acinar type, Lemhi's grade 3+4=7, grade group is 2, number of cores involved is 3 of 4, % of grade 4 is 30 %,total surface area involved is 30%,perineural invasion is  identified     IMAGING:  All pertinent imaging reviewed:    All imaging studies reviewed by me.  I personally reviewed these imaging films.  A formal report from radiology will follow.    MRI PROSTATE 7/23/21:  FINDINGS:  Size:  35.7 grams  Hemorrhage: Absent  Peripheral zone: Heterogeneous on T2-weighted images. Suspicious  lesions as detailed below.  Transition zone: Enlarged with BPH changes. Transition zone  nodules  which are circumscribed or mostly encapsulated without diffusion  restriction.  PI-RADS 2.  No highly suspicious nodules.     Lesion(s) in rank order of severity (highest score- to lowest score,  then by size)      Lesion 1:  Location: Right apex peripheral zone at the 10-12 o'clock position  relative to the urethra. Series 14 image 21.  Series 7, image 65  Size: 14 mm  T2 description: Not circumscribed, moderate hypointensity  T2 numerical assessment: 3  DWI description: Focal markedly hypointense on ADC and markedly  hyperintense on high B-value DWI.  DWI numerical assessment: 4  DCE assessment: Positive    Prostate margin: Capsular abutment 6-15 mm  Lesion overall PI-RADS category: 4     Neurovascular bundles: No suspicion of involvement by malignancy  Seminal vesicles: Not involved by tumor.  Lymph nodes: No significant adenopathy. Subcentimeter bilateral  external iliac lymph nodes (series 19, image 26), measuring 4 mm on  the right and 3 mm on the left. Subcentimeter bilateral inguinal lymph  nodes likely reactive  Bones: No suspicious lesions.  Other pelvic organs: No additional findings. Colonic diverticulosis..  Small fat-containing left inguinal hernia.                                            IMPRESSION:  1. Based on the most suspicious abnormality, this exam is  characterized as PIRADS 4 - Clinically significant cancer is likely to  be present.?The most suspicious abnormality is located at the right  apex peripheral zone, the 10:00 to 12:00 position and there is short  segment capsular abutment with low likelihood of minimal  extraprostatic extension.  2. No suspicious adenopathy or evidence of pelvic metastases.    ASSESSMENT and PLAN  65-year-old man with elevated PSA at 9.31 and now with an MRI with a concerning PI-RADS 4 lesion with low likelihood of minimal extraprostatic extension there is no evidence of adenopathy    MSK Nomogram:  Cancer-specific survival after RALP:  99% (10  years) 99%(15 years)  Progression-free % after RALP:  84% (5 years)  73% (10 years)  Organ-confined disease:   60%  Extra-prostatic extension:   38%  Lymph node involvement:   3%  Seminal Vesicle invasion:   3%    DISCUSSION PROSTATE CANCER     The natural history of this disease was explained to the patient at length and the treatment options discussed including radical prostatectomy by open or robotic-assisted laparoscopic approach, external-beam radiotherapy, brachytherapy, active surveillance and watchful waiting, including the probability of success and complications associated with these approaches.    With respect to watchful waiting, we discussed the difficulties of estimating the extent of disease preoperatively, the risk of cancer progression, and risk that salvage might not be possible with progression. However, the favorable 10-year outcomes of active surveillance in appropriately selected patients was conveyed.  We discussed that given his Glade 3+4 = 7 prostate cancer he would be at higher risk for progression while on active surveillance.  This would still technically be an option and would require genetic testing.  He is not interested in this option.    With respect to seed implants, we discussed risks including but not limited to cancer recurrence, exacerbation of voiding symptoms or hematuria, urinary retention, induction of 2nd malignancy, and risks of rectal symptoms or bleeding.  We also discussed risks of the anesthesia including but not limited to MI, CVA, DVT, and PE.      With respect to EBRT, we discussed we discussed risks including but not limited to cancer recurrence, exacerbation of voiding symptoms or hematuria, urinary retention, incontinence, stricture of the urinary tract, induction of 2nd malignancy, and risks of rectal symptoms or bleeding.  We discussed fact that rectal symptoms may be more common with EBRT than with other treatment modalities.     With radiation therapy, we  discussed the difficulties in diagnosing recurrent disease at an early stage due to variability in PSA levels and the fact that most patients are not candidates for local salvage therapy when biochemical recurrence is declared.  We also discussed the significant morbidity of local salvage therapy in terms of perioperative complications, erectile dysfunction and urinary incontinence.    With respect to radical prostatectomy, the pros and cons of open vs robotic-assisted laparoscopic prostatectomy were discussed. The complications of this procedure were reviewed with the patient and include (but not limited to) urinary incontinence, erectile dysfunction, infertility, anastomotic stricture, lymphocele, hemorrhage requiring transfusion, rectal, ureteral, or nerve injury, infection, cardiovascular, pulmonary, thromboembolic, and anesthetic complications.  For robotic prostatectomy, the additional complications of anastomotic urine leak and small bowel obstruction from adhesions was conveyed. The anticipated post-operative course was explained, including an anticipated 1 night hospital stay.  We will plan for bilateral nerve sparing    With surgery, we also discussed the potential advantage over radiation therapy in that biochemical recurrence can be detected at a relatively earlier stage and that salvage radiotherapy is successful in controlling recurrent disease in a substantial proportion of patients.  I also conveyed that salvage radiotherapy was associated with a considerably more favorable morbidity profile compared to local salvage therapies for radiorecurent disease.     All questions were answered.      Patient has decided he would like to proceed with Robotic Radical Prostatectomy - [id number]    The risks, benefits, alternatives, and personnel involved in the procudure were discussed.  All questions were answered.  A written informed consent will be finalized on the morning of the procedure.    Plan:  -Orders for  surgery placed      Time spent: 30 minutes spent on the date of the encounter doing chart review, history and exam, documentation and further activities as noted above.    Avila Whaley MD   Urology  [facility]  [facility] Phone: [phone]  [facility] Phone: [phone]        Francoise is a 66 year old who is being evaluated via a billable video visit.      How would you like to obtain your AVS? Mail a copy  If the video visit is dropped, the invitation should be resent by: Text to cell phone: [phone]  Will anyone else be joining your video visit? No      Video-Visit Details    Type of service:  Video Visit    Video Start Time: 4:14 PM    Video End Time:4:36 PM    Originating Location (pt. Location): Home    Distant Location (provider location):  [facility]     Platform used for Video Visit: Ninite

## 2021-10-18 DIAGNOSIS — Z11.59 ENCOUNTER FOR SCREENING FOR OTHER VIRAL DISEASES: ICD-10-CM

## 2021-11-02 ENCOUNTER — OFFICE VISIT (OUTPATIENT)
Dept: FAMILY MEDICINE | Facility: CLINIC | Age: 66
End: 2021-11-02
Payer: COMMERCIAL

## 2021-11-02 VITALS
SYSTOLIC BLOOD PRESSURE: 136 MMHG | BODY MASS INDEX: 23.49 KG/M2 | DIASTOLIC BLOOD PRESSURE: 80 MMHG | OXYGEN SATURATION: 99 % | HEART RATE: 86 BPM | TEMPERATURE: 97.7 F | WEIGHT: 150 LBS

## 2021-11-02 DIAGNOSIS — Z01.818 PREOP GENERAL PHYSICAL EXAM: Primary | ICD-10-CM

## 2021-11-02 DIAGNOSIS — C61 PROSTATE CANCER (H): ICD-10-CM

## 2021-11-02 DIAGNOSIS — Z23 NEED FOR VACCINATION: ICD-10-CM

## 2021-11-02 LAB — HGB BLD-MCNC: 13.2 G/DL (ref 13.3–17.7)

## 2021-11-02 PROCEDURE — 90732 PPSV23 VACC 2 YRS+ SUBQ/IM: CPT | Performed by: FAMILY MEDICINE

## 2021-11-02 PROCEDURE — 99214 OFFICE O/P EST MOD 30 MIN: CPT | Mod: 25 | Performed by: FAMILY MEDICINE

## 2021-11-02 PROCEDURE — 85018 HEMOGLOBIN: CPT | Performed by: FAMILY MEDICINE

## 2021-11-02 PROCEDURE — 93000 ELECTROCARDIOGRAM COMPLETE: CPT | Performed by: FAMILY MEDICINE

## 2021-11-02 PROCEDURE — 36415 COLL VENOUS BLD VENIPUNCTURE: CPT | Performed by: FAMILY MEDICINE

## 2021-11-02 PROCEDURE — 90471 IMMUNIZATION ADMIN: CPT | Performed by: FAMILY MEDICINE

## 2021-11-02 RX ORDER — MULTIPLE VITAMINS W/ MINERALS TAB 9MG-400MCG
1 TAB ORAL DAILY
COMMUNITY
End: 2021-11-19

## 2021-11-02 ASSESSMENT — PAIN SCALES - GENERAL: PAINLEVEL: NO PAIN (0)

## 2021-11-02 NOTE — H&P (VIEW-ONLY)
[address] [address]  Phone: [phone]  Primary Provider: Tesfaye Kelley        PREOPERATIVE EVALUATION:  Today's date: 11/2/2021    Francoise Valerio is a 66 year old male who presents for a preoperative evaluation.    Surgical Information:  Surgery/Procedure: prostate surgery   Surgery Location: [facility]   Surgeon:  Dr Whaley   Surgery Date: 11/22/20212  Time of Surgery:  9:40   Where patient plans to recover: At home with family  Fax number for surgical facility: Note does not need to be faxed, will be available electronically in Epic.    Type of Anesthesia Anticipated: General    Assessment & Plan     The proposed surgical procedure is considered INTERMEDIATE risk.    Preop general physical exam    - EKG 12-lead complete w/read - Clinics  - Hemoglobin; Future    Prostate cancer (H)    - Hemoglobin; Future      Risks and Recommendations:  The patient has the following additional risks and recommendations for perioperative complications:   - No identified additional risk factors other than previously addressed    Medication Instructions:   - aspirin: Discontinue aspirin 7-10 days prior to procedure to reduce bleeding risk. It should be resumed postoperatively.     RECOMMENDATION:  APPROVAL GIVEN to proceed with proposed procedure, without further diagnostic evaluation.    Review of external notes as documented above           Subjective       Preop Questions 11/2/2021   1. Have you ever had a heart attack or stroke? No   2. Have you ever had surgery on your heart or blood vessels, such as a stent placement, a coronary artery bypass, or surgery on an artery in your head, neck, heart, or legs? No   3. Do you have chest pain with activity? No   4. Do you have a history of  heart failure? No   5. Do you currently have a cold, bronchitis or symptoms of other infection? No   6. Do you have a cough, shortness of breath, or wheezing? No   7. Do you  or anyone in your family have previous history of blood clots? No   8. Do you or does anyone in your family have a serious bleeding problem such as prolonged bleeding following surgeries or cuts? No   9. Have you ever had problems with anemia or been told to take iron pills? No   10. Have you had any abnormal blood loss such as black, tarry or bloody stools? No   11. Have you ever had a blood transfusion? No   12. Are you willing to have a blood transfusion if it is medically needed before, during, or after your surgery? NO    13. Have you or any of your relatives ever had problems with anesthesia? No   14. Do you have sleep apnea, excessive snoring or daytime drowsiness? YES    14a. Do you have a CPAP machine? No   15. Do you have any artifical heart valves or other implanted medical devices like a pacemaker, defibrillator, or continuous glucose monitor? No   16. Do you have artificial joints? No   17. Are you allergic to latex? No       Health Care Directive:  Patient does not have a Health Care Directive or Living Will: Discussed advance care planning with patient; however, patient declined at this time.    Preoperative Review of :   reviewed - no record of controlled substances prescribed.          Review of Systems  CONSTITUTIONAL: NEGATIVE for fever, chills, change in weight  ENT/MOUTH: NEGATIVE for ear, mouth and throat problems  RESP: NEGATIVE for significant cough or SOB  CV: NEGATIVE for chest pain, palpitations or peripheral edema    Patient Active Problem List    Diagnosis Date Noted     Prediabetes 11/21/2012     Priority: High     Elevated prostate specific antigen (PSA) 05/07/2021     Priority: Medium     Vitamin D deficiency 11/20/2012     Priority: Medium     Problem list name updated by automated process. 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 On [facility] (Dr. Moran) 10 yrs.         Advanced directives, counseling/discussion 10/24/2011     Priority: Low     Advance Care Planning 2/2/2017: ACP Review of Chart / Resources Provided:  Reviewed chart for advance care plan.  Francoise Valerio has been provided information and resources to begin or update their advance care plan.  Added by Zenia Farfan        Advance Directive Problem List Overview:   Name Relationship Phone    Primary Health Care Agent            Alternative Health Care Agent      10/24/11 Mailed pt informational letter regarding the [employer] Program for the development of an Advanced Care Directive. Jasmin Andrews RN        CARDIOVASCULAR SCREENING; LDL GOAL LESS THAN 160 10/31/2010     Priority: Low      Past Medical History:   Diagnosis Date     Anemia, unspecified      Extrinsic asthma, unspecified      Other disorders of bone and cartilage(733.99)     polychondritis on the ear     Other specified disorders of pancreatic internal secretion      Past Surgical History:   Procedure Laterality Date     PROSTATE SURGERY       Current Outpatient Medications   Medication Sig Dispense Refill     Ascorbic Acid (VITAMIN C PO) Take by mouth daily        ciprofloxacin (CIPRO) 500 MG tablet Take 1 tablet (500 mg) by mouth 2 times daily Start taking the day before your prostate biopsy. (Patient not taking: Reported on 9/22/2021) 6 tablet 0     omeprazole (PRILOSEC) 20 MG DR capsule Take 1 capsule (20 mg) by mouth daily (Patient not taking: Reported on 9/10/2021) 30 capsule 1       Allergies   Allergen Reactions     Penicillins         Social History     Tobacco Use     Smoking status: Never Smoker     Smokeless tobacco: Never Used   Substance Use Topics     Alcohol use: No     Family History   Problem Relation Age of Onset     Heart Disease Mother         tachycardia/palpitations     History   Drug Use No         Objective     /80   Pulse 86   Temp 97.7  F (36.5  C) (Tympanic)   Wt 68 kg (150 lb)   SpO2 99%   BMI 23.49  kg/m      Physical Exam  GENERAL APPEARANCE: healthy, alert and no distress  HENT: ear canals and TM's normal and nose and mouth without ulcers or lesions  RESP: lungs clear to auscultation - no rales, rhonchi or wheezes  CV: regular rate and rhythm, normal S1 S2, no S3 or S4 and no murmur, click or rub   ABDOMEN: soft, nontender, no HSM or masses and bowel sounds normal  NEURO: Normal strength and tone, sensory exam grossly normal, mentation intact and speech normal    Recent Labs   Lab Test 05/07/21  0851 02/06/20  1046   HGB  --  13.2*   PLT  --  239    141   POTASSIUM 4.1 4.1   CR 0.78 0.76   A1C  --  6.0*        Diagnostics:  Recent Results (from the past 24 hour(s))   Hemoglobin    Collection Time: 11/02/21 12:33 PM   Result Value Ref Range    Hemoglobin 13.2 (L) 13.3 - 17.7 g/dL      EKG: appears normal, NSR, normal axis, normal intervals, no acute ST/T changes c/w ischemia, no LVH by voltage criteria    Revised Cardiac Risk Index (RCRI):  The patient has the following serious cardiovascular risks for perioperative complications:   - No serious cardiac risks = 0 points     RCRI Interpretation: 0 points: Class I (very low risk - 0.4% complication rate)           Signed Electronically by: Tesfaye Kelley MD  Copy of this evaluation report is provided to requesting physician.

## 2021-11-02 NOTE — PROGRESS NOTES
[address] [address]  Phone: [phone]  Primary Provider: Tesfaye Kelley        PREOPERATIVE EVALUATION:  Today's date: 11/2/2021    Francoise Valerio is a 66 year old male who presents for a preoperative evaluation.    Surgical Information:  Surgery/Procedure: prostate surgery   Surgery Location: [facility]   Surgeon:  Dr Whaley   Surgery Date: 11/22/20212  Time of Surgery:  9:40   Where patient plans to recover: At home with family  Fax number for surgical facility: Note does not need to be faxed, will be available electronically in Epic.    Type of Anesthesia Anticipated: General    Assessment & Plan     The proposed surgical procedure is considered INTERMEDIATE risk.    Preop general physical exam    - EKG 12-lead complete w/read - Clinics  - Hemoglobin; Future    Prostate cancer (H)    - Hemoglobin; Future      Risks and Recommendations:  The patient has the following additional risks and recommendations for perioperative complications:   - No identified additional risk factors other than previously addressed    Medication Instructions:   - aspirin: Discontinue aspirin 7-10 days prior to procedure to reduce bleeding risk. It should be resumed postoperatively.     RECOMMENDATION:  APPROVAL GIVEN to proceed with proposed procedure, without further diagnostic evaluation.    Review of external notes as documented above           Subjective       Preop Questions 11/2/2021   1. Have you ever had a heart attack or stroke? No   2. Have you ever had surgery on your heart or blood vessels, such as a stent placement, a coronary artery bypass, or surgery on an artery in your head, neck, heart, or legs? No   3. Do you have chest pain with activity? No   4. Do you have a history of  heart failure? No   5. Do you currently have a cold, bronchitis or symptoms of other infection? No   6. Do you have a cough, shortness of breath, or wheezing? No   7. Do you  or anyone in your family have previous history of blood clots? No   8. Do you or does anyone in your family have a serious bleeding problem such as prolonged bleeding following surgeries or cuts? No   9. Have you ever had problems with anemia or been told to take iron pills? No   10. Have you had any abnormal blood loss such as black, tarry or bloody stools? No   11. Have you ever had a blood transfusion? No   12. Are you willing to have a blood transfusion if it is medically needed before, during, or after your surgery? NO    13. Have you or any of your relatives ever had problems with anesthesia? No   14. Do you have sleep apnea, excessive snoring or daytime drowsiness? YES    14a. Do you have a CPAP machine? No   15. Do you have any artifical heart valves or other implanted medical devices like a pacemaker, defibrillator, or continuous glucose monitor? No   16. Do you have artificial joints? No   17. Are you allergic to latex? No       Health Care Directive:  Patient does not have a Health Care Directive or Living Will: Discussed advance care planning with patient; however, patient declined at this time.    Preoperative Review of :   reviewed - no record of controlled substances prescribed.          Review of Systems  CONSTITUTIONAL: NEGATIVE for fever, chills, change in weight  ENT/MOUTH: NEGATIVE for ear, mouth and throat problems  RESP: NEGATIVE for significant cough or SOB  CV: NEGATIVE for chest pain, palpitations or peripheral edema    Patient Active Problem List    Diagnosis Date Noted     Prediabetes 11/21/2012     Priority: High     Elevated prostate specific antigen (PSA) 05/07/2021     Priority: Medium     Vitamin D deficiency 11/20/2012     Priority: Medium     Problem list name updated by automated process. 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 On [facility] (Dr. Moran) 10 yrs.         Advanced directives, counseling/discussion 10/24/2011     Priority: Low     Advance Care Planning 2/2/2017: ACP Review of Chart / Resources Provided:  Reviewed chart for advance care plan.  Francoise Valerio has been provided information and resources to begin or update their advance care plan.  Added by Zenia Farfan        Advance Directive Problem List Overview:   Name Relationship Phone    Primary Health Care Agent            Alternative Health Care Agent      10/24/11 Mailed pt informational letter regarding the [employer] for the development of an Advanced Care Directive. Jasmin Andrews RN        CARDIOVASCULAR SCREENING; LDL GOAL LESS THAN 160 10/31/2010     Priority: Low      Past Medical History:   Diagnosis Date     Anemia, unspecified      Extrinsic asthma, unspecified      Other disorders of bone and cartilage(733.99)     polychondritis on the ear     Other specified disorders of pancreatic internal secretion      Past Surgical History:   Procedure Laterality Date     PROSTATE SURGERY       Current Outpatient Medications   Medication Sig Dispense Refill     Ascorbic Acid (VITAMIN C PO) Take by mouth daily        ciprofloxacin (CIPRO) 500 MG tablet Take 1 tablet (500 mg) by mouth 2 times daily Start taking the day before your prostate biopsy. (Patient not taking: Reported on 9/22/2021) 6 tablet 0     omeprazole (PRILOSEC) 20 MG DR capsule Take 1 capsule (20 mg) by mouth daily (Patient not taking: Reported on 9/10/2021) 30 capsule 1       Allergies   Allergen Reactions     Penicillins         Social History     Tobacco Use     Smoking status: Never Smoker     Smokeless tobacco: Never Used   Substance Use Topics     Alcohol use: No     Family History   Problem Relation Age of Onset     Heart Disease Mother         tachycardia/palpitations     History   Drug Use No         Objective     /80   Pulse 86   Temp 97.7  F (36.5  C) (Tympanic)   Wt 68 kg (150 lb)   SpO2 99%   BMI 23.49  kg/m      Physical Exam  GENERAL APPEARANCE: healthy, alert and no distress  HENT: ear canals and TM's normal and nose and mouth without ulcers or lesions  RESP: lungs clear to auscultation - no rales, rhonchi or wheezes  CV: regular rate and rhythm, normal S1 S2, no S3 or S4 and no murmur, click or rub   ABDOMEN: soft, nontender, no HSM or masses and bowel sounds normal  NEURO: Normal strength and tone, sensory exam grossly normal, mentation intact and speech normal    Recent Labs   Lab Test 05/07/21  0851 02/06/20  1046   HGB  --  13.2*   PLT  --  239    141   POTASSIUM 4.1 4.1   CR 0.78 0.76   A1C  --  6.0*        Diagnostics:  Recent Results (from the past 24 hour(s))   Hemoglobin    Collection Time: 11/02/21 12:33 PM   Result Value Ref Range    Hemoglobin 13.2 (L) 13.3 - 17.7 g/dL      EKG: appears normal, NSR, normal axis, normal intervals, no acute ST/T changes c/w ischemia, no LVH by voltage criteria    Revised Cardiac Risk Index (RCRI):  The patient has the following serious cardiovascular risks for perioperative complications:   - No serious cardiac risks = 0 points     RCRI Interpretation: 0 points: Class I (very low risk - 0.4% complication rate)           Signed Electronically by: Tesfaye Kelley MD  Copy of this evaluation report is provided to requesting physician.

## 2021-11-18 ENCOUNTER — LAB (OUTPATIENT)
Dept: URGENT CARE | Facility: URGENT CARE | Age: 66
End: 2021-11-18
Attending: UROLOGY
Payer: COMMERCIAL

## 2021-11-18 DIAGNOSIS — Z11.59 ENCOUNTER FOR SCREENING FOR OTHER VIRAL DISEASES: ICD-10-CM

## 2021-11-18 PROCEDURE — U0003 INFECTIOUS AGENT DETECTION BY NUCLEIC ACID (DNA OR RNA); SEVERE ACUTE RESPIRATORY SYNDROME CORONAVIRUS 2 (SARS-COV-2) (CORONAVIRUS DISEASE [COVID-19]), AMPLIFIED PROBE TECHNIQUE, MAKING USE OF HIGH THROUGHPUT TECHNOLOGIES AS DESCRIBED BY CMS-2020-01-R: HCPCS

## 2021-11-18 PROCEDURE — U0005 INFEC AGEN DETEC AMPLI PROBE: HCPCS

## 2021-11-19 LAB — SARS-COV-2 RNA RESP QL NAA+PROBE: NEGATIVE

## 2021-11-19 NOTE — PROGRESS NOTES
PTA medications updated by Medication Scribe prior to surgery via phone call with patient (last doses completed by Nurse)     Medication history sources: Patient, Surescripts and H&P  In the past week, patient estimated taking medication this percent of the time: Greater than 90%  Adherence assessment: N/A Not Observed    Significant changes made to the medication list:  None      Additional medication history information:   None    Medication reconciliation completed by provider prior to medication history? No    Time spent in this activity: 20 MINUTES    The information provided in this note is only as accurate as the sources available at the time of update(s)      Prior to Admission medications    Not on File

## 2021-11-21 ENCOUNTER — ANESTHESIA EVENT (OUTPATIENT)
Dept: SURGERY | Facility: CLINIC | Age: 66
End: 2021-11-21
Payer: COMMERCIAL

## 2021-11-22 ENCOUNTER — HOSPITAL ENCOUNTER (OUTPATIENT)
Facility: CLINIC | Age: 66
Discharge: HOME | End: 2021-11-23
Attending: UROLOGY | Admitting: UROLOGY
Payer: COMMERCIAL

## 2021-11-22 ENCOUNTER — ANESTHESIA (OUTPATIENT)
Dept: SURGERY | Facility: CLINIC | Age: 66
End: 2021-11-22
Payer: COMMERCIAL

## 2021-11-22 DIAGNOSIS — C61 PROSTATE CANCER (H): Primary | ICD-10-CM

## 2021-11-22 PROCEDURE — 250N000011 HC RX IP 250 OP 636: Performed by: UROLOGY

## 2021-11-22 PROCEDURE — 710N000009 HC RECOVERY PHASE 1, LEVEL 1, PER MIN: Performed by: UROLOGY

## 2021-11-22 PROCEDURE — 258N000003 HC RX IP 258 OP 636: Performed by: ANESTHESIOLOGY

## 2021-11-22 PROCEDURE — 258N000003 HC RX IP 258 OP 636: Performed by: NURSE ANESTHETIST, CERTIFIED REGISTERED

## 2021-11-22 PROCEDURE — 250N000013 HC RX MED GY IP 250 OP 250 PS 637: Performed by: STUDENT IN AN ORGANIZED HEALTH CARE EDUCATION/TRAINING PROGRAM

## 2021-11-22 PROCEDURE — S2900 ROBOTIC SURGICAL SYSTEM: HCPCS | Performed by: UROLOGY

## 2021-11-22 PROCEDURE — 250N000009 HC RX 250: Performed by: UROLOGY

## 2021-11-22 PROCEDURE — 250N000011 HC RX IP 250 OP 636: Performed by: NURSE ANESTHETIST, CERTIFIED REGISTERED

## 2021-11-22 PROCEDURE — 250N000025 HC SEVOFLURANE, PER MIN: Performed by: UROLOGY

## 2021-11-22 PROCEDURE — 999N000141 HC STATISTIC PRE-PROCEDURE NURSING ASSESSMENT: Performed by: UROLOGY

## 2021-11-22 PROCEDURE — 360N000080 HC SURGERY LEVEL 7, PER MIN: Performed by: UROLOGY

## 2021-11-22 PROCEDURE — 250N000009 HC RX 250: Performed by: NURSE ANESTHETIST, CERTIFIED REGISTERED

## 2021-11-22 PROCEDURE — 258N000001 HC RX 258: Performed by: UROLOGY

## 2021-11-22 PROCEDURE — 272N000001 HC OR GENERAL SUPPLY STERILE: Performed by: UROLOGY

## 2021-11-22 PROCEDURE — 55866 LAPS SURG PRST8ECT RPBIC RAD: CPT | Performed by: UROLOGY

## 2021-11-22 PROCEDURE — 38571 LAPAROSCOPY LYMPHADENECTOMY: CPT | Mod: 51 | Performed by: UROLOGY

## 2021-11-22 PROCEDURE — 370N000017 HC ANESTHESIA TECHNICAL FEE, PER MIN: Performed by: UROLOGY

## 2021-11-22 PROCEDURE — 250N000011 HC RX IP 250 OP 636: Performed by: STUDENT IN AN ORGANIZED HEALTH CARE EDUCATION/TRAINING PROGRAM

## 2021-11-22 PROCEDURE — 88309 TISSUE EXAM BY PATHOLOGIST: CPT | Mod: TC | Performed by: UROLOGY

## 2021-11-22 RX ORDER — KETOROLAC TROMETHAMINE 15 MG/ML
15 INJECTION, SOLUTION INTRAMUSCULAR; INTRAVENOUS EVERY 6 HOURS
Status: DISCONTINUED | OUTPATIENT
Start: 2021-11-22 | End: 2021-11-23 | Stop reason: HOSPADM

## 2021-11-22 RX ORDER — OXYBUTYNIN CHLORIDE 5 MG/1
5 TABLET, EXTENDED RELEASE ORAL DAILY
Qty: 14 TABLET | Refills: 0 | Status: SHIPPED | OUTPATIENT
Start: 2021-11-22 | End: 2021-12-06

## 2021-11-22 RX ORDER — CLINDAMYCIN PHOSPHATE 900 MG/50ML
900 INJECTION, SOLUTION INTRAVENOUS
Status: COMPLETED | OUTPATIENT
Start: 2021-11-22 | End: 2021-11-22

## 2021-11-22 RX ORDER — OXYCODONE HYDROCHLORIDE 5 MG/1
5 TABLET ORAL EVERY 4 HOURS PRN
Status: CANCELLED | OUTPATIENT
Start: 2021-11-22

## 2021-11-22 RX ORDER — NALOXONE HYDROCHLORIDE 0.4 MG/ML
0.4 INJECTION, SOLUTION INTRAMUSCULAR; INTRAVENOUS; SUBCUTANEOUS
Status: DISCONTINUED | OUTPATIENT
Start: 2021-11-22 | End: 2021-11-23 | Stop reason: HOSPADM

## 2021-11-22 RX ORDER — GLYCOPYRROLATE 0.2 MG/ML
INJECTION, SOLUTION INTRAMUSCULAR; INTRAVENOUS PRN
Status: DISCONTINUED | OUTPATIENT
Start: 2021-11-22 | End: 2021-11-22

## 2021-11-22 RX ORDER — CIPROFLOXACIN 500 MG/1
500 TABLET, FILM COATED ORAL 2 TIMES DAILY
Qty: 6 TABLET | Refills: 0 | Status: SHIPPED | OUTPATIENT
Start: 2021-11-22 | End: 2021-11-25

## 2021-11-22 RX ORDER — HYDROMORPHONE HCL IN WATER/PF 6 MG/30 ML
0.2 PATIENT CONTROLLED ANALGESIA SYRINGE INTRAVENOUS EVERY 5 MIN PRN
Status: DISCONTINUED | OUTPATIENT
Start: 2021-11-22 | End: 2021-11-22 | Stop reason: HOSPADM

## 2021-11-22 RX ORDER — ONDANSETRON 2 MG/ML
4 INJECTION INTRAMUSCULAR; INTRAVENOUS EVERY 30 MIN PRN
Status: DISCONTINUED | OUTPATIENT
Start: 2021-11-22 | End: 2021-11-22 | Stop reason: HOSPADM

## 2021-11-22 RX ORDER — CLINDAMYCIN PHOSPHATE 900 MG/50ML
900 INJECTION, SOLUTION INTRAVENOUS SEE ADMIN INSTRUCTIONS
Status: DISCONTINUED | OUTPATIENT
Start: 2021-11-22 | End: 2021-11-22 | Stop reason: HOSPADM

## 2021-11-22 RX ORDER — BUPIVACAINE HYDROCHLORIDE 2.5 MG/ML
INJECTION, SOLUTION INFILTRATION; PERINEURAL PRN
Status: DISCONTINUED | OUTPATIENT
Start: 2021-11-22 | End: 2021-11-22 | Stop reason: HOSPADM

## 2021-11-22 RX ORDER — FENTANYL CITRATE 0.05 MG/ML
25 INJECTION, SOLUTION INTRAMUSCULAR; INTRAVENOUS EVERY 5 MIN PRN
Status: DISCONTINUED | OUTPATIENT
Start: 2021-11-22 | End: 2021-11-22 | Stop reason: HOSPADM

## 2021-11-22 RX ORDER — PROPOFOL 10 MG/ML
INJECTION, EMULSION INTRAVENOUS CONTINUOUS PRN
Status: DISCONTINUED | OUTPATIENT
Start: 2021-11-22 | End: 2021-11-22

## 2021-11-22 RX ORDER — DEXAMETHASONE SODIUM PHOSPHATE 4 MG/ML
INJECTION, SOLUTION INTRA-ARTICULAR; INTRALESIONAL; INTRAMUSCULAR; INTRAVENOUS; SOFT TISSUE PRN
Status: DISCONTINUED | OUTPATIENT
Start: 2021-11-22 | End: 2021-11-22

## 2021-11-22 RX ORDER — MAGNESIUM HYDROXIDE 1200 MG/15ML
LIQUID ORAL PRN
Status: DISCONTINUED | OUTPATIENT
Start: 2021-11-22 | End: 2021-11-22 | Stop reason: HOSPADM

## 2021-11-22 RX ORDER — HEPARIN SODIUM 5000 [USP'U]/.5ML
5000 INJECTION, SOLUTION INTRAVENOUS; SUBCUTANEOUS
Status: COMPLETED | OUTPATIENT
Start: 2021-11-22 | End: 2021-11-22

## 2021-11-22 RX ORDER — OXYCODONE HYDROCHLORIDE 5 MG/1
5 TABLET ORAL EVERY 4 HOURS PRN
Status: DISCONTINUED | OUTPATIENT
Start: 2021-11-22 | End: 2021-11-23 | Stop reason: HOSPADM

## 2021-11-22 RX ORDER — LIDOCAINE 40 MG/G
CREAM TOPICAL
Status: DISCONTINUED | OUTPATIENT
Start: 2021-11-22 | End: 2021-11-23 | Stop reason: HOSPADM

## 2021-11-22 RX ORDER — PROPOFOL 10 MG/ML
INJECTION, EMULSION INTRAVENOUS PRN
Status: DISCONTINUED | OUTPATIENT
Start: 2021-11-22 | End: 2021-11-22

## 2021-11-22 RX ORDER — ONDANSETRON 4 MG/1
4 TABLET, ORALLY DISINTEGRATING ORAL EVERY 30 MIN PRN
Status: DISCONTINUED | OUTPATIENT
Start: 2021-11-22 | End: 2021-11-22 | Stop reason: HOSPADM

## 2021-11-22 RX ORDER — ONDANSETRON 2 MG/ML
INJECTION INTRAMUSCULAR; INTRAVENOUS PRN
Status: DISCONTINUED | OUTPATIENT
Start: 2021-11-22 | End: 2021-11-22

## 2021-11-22 RX ORDER — HYDROMORPHONE HCL IN WATER/PF 6 MG/30 ML
0.2 PATIENT CONTROLLED ANALGESIA SYRINGE INTRAVENOUS
Status: DISCONTINUED | OUTPATIENT
Start: 2021-11-22 | End: 2021-11-23 | Stop reason: HOSPADM

## 2021-11-22 RX ORDER — NEOSTIGMINE METHYLSULFATE 1 MG/ML
VIAL (ML) INJECTION PRN
Status: DISCONTINUED | OUTPATIENT
Start: 2021-11-22 | End: 2021-11-22

## 2021-11-22 RX ORDER — EPHEDRINE SULFATE 50 MG/ML
INJECTION, SOLUTION INTRAMUSCULAR; INTRAVENOUS; SUBCUTANEOUS PRN
Status: DISCONTINUED | OUTPATIENT
Start: 2021-11-22 | End: 2021-11-22

## 2021-11-22 RX ORDER — NALOXONE HYDROCHLORIDE 0.4 MG/ML
0.2 INJECTION, SOLUTION INTRAMUSCULAR; INTRAVENOUS; SUBCUTANEOUS
Status: DISCONTINUED | OUTPATIENT
Start: 2021-11-22 | End: 2021-11-23 | Stop reason: HOSPADM

## 2021-11-22 RX ORDER — SODIUM CHLORIDE, SODIUM LACTATE, POTASSIUM CHLORIDE, CALCIUM CHLORIDE 600; 310; 30; 20 MG/100ML; MG/100ML; MG/100ML; MG/100ML
INJECTION, SOLUTION INTRAVENOUS CONTINUOUS
Status: DISCONTINUED | OUTPATIENT
Start: 2021-11-22 | End: 2021-11-22 | Stop reason: HOSPADM

## 2021-11-22 RX ORDER — HYDROMORPHONE HCL IN WATER/PF 6 MG/30 ML
0.4 PATIENT CONTROLLED ANALGESIA SYRINGE INTRAVENOUS
Status: DISCONTINUED | OUTPATIENT
Start: 2021-11-22 | End: 2021-11-23 | Stop reason: HOSPADM

## 2021-11-22 RX ORDER — OXYCODONE HYDROCHLORIDE 5 MG/1
5 TABLET ORAL EVERY 6 HOURS PRN
Qty: 9 TABLET | Refills: 0 | Status: SHIPPED | OUTPATIENT
Start: 2021-11-22 | End: 2022-06-17

## 2021-11-22 RX ORDER — GENTAMICIN SULFATE 80 MG/100ML
80 INJECTION, SOLUTION INTRAVENOUS
Status: COMPLETED | OUTPATIENT
Start: 2021-11-22 | End: 2021-11-22

## 2021-11-22 RX ORDER — OXYCODONE HYDROCHLORIDE 5 MG/1
10 TABLET ORAL EVERY 4 HOURS PRN
Status: DISCONTINUED | OUTPATIENT
Start: 2021-11-22 | End: 2021-11-23 | Stop reason: HOSPADM

## 2021-11-22 RX ORDER — FENTANYL CITRATE 50 UG/ML
INJECTION, SOLUTION INTRAMUSCULAR; INTRAVENOUS PRN
Status: DISCONTINUED | OUTPATIENT
Start: 2021-11-22 | End: 2021-11-22

## 2021-11-22 RX ORDER — ACETAMINOPHEN 325 MG/1
650 TABLET ORAL
Status: DISCONTINUED | OUTPATIENT
Start: 2021-11-22 | End: 2021-11-23

## 2021-11-22 RX ORDER — ASPIRIN 81 MG
100 TABLET, DELAYED RELEASE (ENTERIC COATED) ORAL DAILY
Qty: 30 TABLET | Refills: 0 | Status: SHIPPED | OUTPATIENT
Start: 2021-11-22 | End: 2021-12-22

## 2021-11-22 RX ORDER — SODIUM CHLORIDE, SODIUM LACTATE, POTASSIUM CHLORIDE, CALCIUM CHLORIDE 600; 310; 30; 20 MG/100ML; MG/100ML; MG/100ML; MG/100ML
INJECTION, SOLUTION INTRAVENOUS CONTINUOUS PRN
Status: DISCONTINUED | OUTPATIENT
Start: 2021-11-22 | End: 2021-11-22

## 2021-11-22 RX ADMIN — SODIUM CHLORIDE, POTASSIUM CHLORIDE, SODIUM LACTATE AND CALCIUM CHLORIDE: 600; 310; 30; 20 INJECTION, SOLUTION INTRAVENOUS at 14:40

## 2021-11-22 RX ADMIN — Medication 5 MG: at 16:10

## 2021-11-22 RX ADMIN — ROCURONIUM BROMIDE 20 MG: 50 INJECTION, SOLUTION INTRAVENOUS at 16:32

## 2021-11-22 RX ADMIN — ROCURONIUM BROMIDE 50 MG: 50 INJECTION, SOLUTION INTRAVENOUS at 14:15

## 2021-11-22 RX ADMIN — SODIUM CHLORIDE, POTASSIUM CHLORIDE, SODIUM LACTATE AND CALCIUM CHLORIDE: 600; 310; 30; 20 INJECTION, SOLUTION INTRAVENOUS at 14:26

## 2021-11-22 RX ADMIN — PHENYLEPHRINE HYDROCHLORIDE 0.2 MCG/KG/MIN: 10 INJECTION INTRAVENOUS at 14:29

## 2021-11-22 RX ADMIN — GLYCOPYRROLATE 0.6 MG: 0.2 INJECTION, SOLUTION INTRAMUSCULAR; INTRAVENOUS at 17:33

## 2021-11-22 RX ADMIN — DEXAMETHASONE SODIUM PHOSPHATE 4 MG: 4 INJECTION, SOLUTION INTRA-ARTICULAR; INTRALESIONAL; INTRAMUSCULAR; INTRAVENOUS; SOFT TISSUE at 14:15

## 2021-11-22 RX ADMIN — ONDANSETRON 4 MG: 2 INJECTION INTRAMUSCULAR; INTRAVENOUS at 17:21

## 2021-11-22 RX ADMIN — FENTANYL CITRATE 50 MCG: 50 INJECTION, SOLUTION INTRAMUSCULAR; INTRAVENOUS at 14:15

## 2021-11-22 RX ADMIN — ACETAMINOPHEN 650 MG: 325 TABLET, FILM COATED ORAL at 20:18

## 2021-11-22 RX ADMIN — CLINDAMYCIN PHOSPHATE 900 MG: 900 INJECTION, SOLUTION INTRAVENOUS at 14:00

## 2021-11-22 RX ADMIN — SODIUM CHLORIDE, POTASSIUM CHLORIDE, SODIUM LACTATE AND CALCIUM CHLORIDE: 600; 310; 30; 20 INJECTION, SOLUTION INTRAVENOUS at 11:40

## 2021-11-22 RX ADMIN — NEOSTIGMINE METHYLSULFATE 3.5 MG: 1 INJECTION, SOLUTION INTRAVENOUS at 17:33

## 2021-11-22 RX ADMIN — KETOROLAC TROMETHAMINE 15 MG: 15 INJECTION, SOLUTION INTRAMUSCULAR; INTRAVENOUS at 20:17

## 2021-11-22 RX ADMIN — HEPARIN SODIUM 5000 UNITS: 5000 INJECTION INTRAVENOUS; SUBCUTANEOUS at 13:23

## 2021-11-22 RX ADMIN — MIDAZOLAM 2 MG: 1 INJECTION INTRAMUSCULAR; INTRAVENOUS at 14:15

## 2021-11-22 RX ADMIN — FENTANYL CITRATE 50 MCG: 50 INJECTION, SOLUTION INTRAMUSCULAR; INTRAVENOUS at 15:41

## 2021-11-22 RX ADMIN — ROCURONIUM BROMIDE 20 MG: 50 INJECTION, SOLUTION INTRAVENOUS at 14:55

## 2021-11-22 RX ADMIN — ROCURONIUM BROMIDE 10 MG: 50 INJECTION, SOLUTION INTRAVENOUS at 16:00

## 2021-11-22 RX ADMIN — GENTAMICIN SULFATE 80 MG: 80 INJECTION, SOLUTION INTRAVENOUS at 14:01

## 2021-11-22 RX ADMIN — PROPOFOL 30 MCG/KG/MIN: 10 INJECTION, EMULSION INTRAVENOUS at 14:15

## 2021-11-22 RX ADMIN — ROCURONIUM BROMIDE 20 MG: 50 INJECTION, SOLUTION INTRAVENOUS at 15:37

## 2021-11-22 RX ADMIN — PROPOFOL 180 MG: 10 INJECTION, EMULSION INTRAVENOUS at 14:15

## 2021-11-22 ASSESSMENT — ACTIVITIES OF DAILY LIVING (ADL)
ADLS_ACUITY_SCORE: 6
ADLS_ACUITY_SCORE: 3
ADLS_ACUITY_SCORE: 6

## 2021-11-22 ASSESSMENT — ENCOUNTER SYMPTOMS: ORTHOPNEA: 0

## 2021-11-22 ASSESSMENT — LIFESTYLE VARIABLES: TOBACCO_USE: 0

## 2021-11-22 NOTE — DISCHARGE INSTRUCTIONS
POSTOPERATIVE INSTRUCTIONS    Diagnosis-------------------------------   Prostate cancer     Procedure-------------------------------  Procedure(s) (LRB):  ROBOTIC ASSISTED LAPAROSCOPIC RADICAL PROSTATECTOMY WITH BILATERAL PELVIC LYMPH NODE DISSECTION (Bilateral)      Findings--------------------------------  Prostate and SVs removed with grossly negative margins.     Home-going instructions-----------------         Activity Limitation:     - No driving or operating heavy machinery while on narcotic pain medication.   - No strenuous exercise for 6 weeks.   - No lifting, pushing, pulling more than 10 pounds for 6 weeks. Take care when pushing with your arms to stand up.   - Do not strain your belly area. When you bend, sit up or twice, you could strain the area around your incision.   - Do not strain with bowel movements.   - Do not drive until you can press the brake pedal quickly and fully without pain.   - Do not operate a motor vehicle while taking narcotic pain medications    FOLLOW THESE INSTRUCTIONS AS INDICATED BELOW:  - Observe operative area for signs of excessive bleeding.  - You may shower.  - Increase fluid intake to promote clear urine.  - Resume usual diet as tolerated    What to expect while recovering----------- TUBES AND DRAINS:  1) Roper Catheter: You are going home with a Roper catheter which will remain in place until your follow-up appointment. Your nurse or  will provide written catheter care instructions for you to take home.   - Protect the catheter and treat it like an extension of your body - keep it secured to your leg and do not let it get caught, snagged, or tugged.   - Should the catheter somehow come out of position, do not let anyone but a urologist who is aware of your recent surgery try to replace a catheter. Best yet, contact our urology office right away with any concerns.   - Apply vaseline twice daily to the tip of the penis/catheter insertion point to keep the area  "lubricated and more comfortable.    What to expect while recovering----------- WOUND CARE:  - You may shower and get incisions wet starting 48 hrs after surgery.  - Do not scrub incisions or submerge wounds (aka, bath, pool, hot tub, etc.) for 2 weeks or until wounds have healed and catheter is removed.   - Remove wound dressing 48 hours after surgery if already not removed.   - If purple dermabond glue was used, avoid applying any lotions or ointments.   - Leave incision open to air. Cover with gauze only if needed for comfort or to protect clothing from drainage.    Discharge Medications/instructions:   1) PAIN: Oxycodone is a narcotic medication that has been prescribed for pain. Narcotics will cause sleepiness and constipation, therefore it is best to stop or reduce them as soon as you can and switch to using acetaminophen (Tylenol) and/or ibuprofen (Advil/Motrin), taken as directed on the packaging. Keep in mind that certain narcotics can contain acetaminophen, also called \"APAP\" on prescription bottles. Do not take more than 4,000mg of Tylenol (acetaminophen/ APAP) from all sources in any 24 hour period since this can cause liver damage. Never drive, operate machinery or drink alcoholic beverages while you are taking narcotic pain medications.     2) CONSTIPATION: Pericolace (senna/docusate sodium) can be taken twice daily for prevention of constipation since surgery, pain medications and bladder spasm medications can all make you constipated. Please reduce or stop pericolace if you develop loose stools. Other over the counter solutions such as prune juice, miralax, fiber products, senna, and dulcolax can also be used. If you are taking the pericolace but still have not had a bowel movement in 3 days, start over-the-counter Milk of Magnesia taken twice daily until you have a nice bowel movement. Call the office with any concerns.     3) ANTIBIOTICS - Ciprofloxacin 500mg should be taken started the day prior to " your your followup appointment to remove your Roper catheter and continued for 3 days.    4) Ditropan: Take daily to decrease catheter discomfort. Stop taking one day before your urology clinic visit      Questions/concerns------------------------  [facility]: [phone]  [facility]: [phone]    Future appointments  You are scheduled for follow up with Dr. Whaley on 12/6/21    Avila Whaley MD

## 2021-11-22 NOTE — BRIEF OP NOTE
[facility]    Brief Operative Note    Pre-operative diagnosis: Prostate cancer (H) [C61]  Post-operative diagnosis Same as pre-operative diagnosis    Procedure: Procedure(s):  ROBOTIC ASSISTED LAPAROSCOPIC RADICAL PROSTATECTOMY WITH BILATERAL PELVIC LYMPH NODE DISSECTION  Surgeon: Surgeon(s) and Role:     * Avila Whaley MD - Primary  Anesthesia: General   Estimated Blood Loss: Less than 100 ml    Drains:  ANDRIA, Roper  Specimens:   ID Type Source Tests Collected by Time Destination   1 : prostate and seminale vescicles Tissue Other SURGICAL PATHOLOGY EXAM Avila Whaley MD 11/22/2021  5:17 PM    2 : bilateral pelvic lymph nodes Tissue Other SURGICAL PATHOLOGY EXAM Avila Whaley MD 11/22/2021  5:18 PM      Findings:   None.  Complications: None.  Implants: * No implants in log *

## 2021-11-22 NOTE — OP NOTE
OPERATIVE REPORT  DATE OF SURGERY: 21  LOCATION OF SURGERY: [facility] OR  PREOPERATIVE DIAGNOSIS:  (C61) Prostate cancer (H)  (primary encounter diagnosis)  POSTOPERATIVE DIAGNOSIS: (C61) Prostate cancer (H)  (primary encounter diagnosis)     START TIME: 2:40 PM  END TIME: 5:39 PM    PROCEDURE PERFORMED:   1. ROBOTIC ASSISTED LAPAROSCOPIC RADICAL PROSTATECTOMY   2. ROBOTIC ASSISTED LAPAROSCOPIC BILATERAL PELVIC LYMPH NODE DISSECTION      STAFF SURGEON: Avila Whaley MD  RESIDENT SURGEON: Sarai Guerrero MD  ASSISTANT: Jenelle Vogt  ANESTHESIA: General.   ESTIMATED BLOOD LOSS: 100 mL.   DRAINS AND TUBES: 18fr [place] tip catheter with 15cc in the balloon, 19fr Theodore drain  COMPLICATIONS: None.   DISPOSITION: PACU.   SPECIMENS OBTAINED:   ID Type Source Tests Collected by Time Destination   1 : prostate and seminale vescicles Tissue Other SURGICAL PATHOLOGY EXAM Avila Whaley MD 2021  5:17 PM    2 : bilateral pelvic lymph nodes Tissue Other SURGICAL PATHOLOGY EXAM Avila Whaley MD 2021  5:18 PM      SIGNIFICANT FINDINGS: Prostate and SVs removed en bloc. Bilateral pelvic lymph nodes removed. Water-tight anastomosis.      HISTORY OF PRESENT ILLNESS: Francoise Valerio is a 66 year old with PSA 9.31, Cuca 3+4=7 prostate cancer. He was counseled on treatment options and elected to proceed with the above surgery.     OPERATION PERFORMED:   Informed consent was obtained and the patient was brought to the operating room where general anesthesia was induced. The patient was given appropriate preoperative antibiotics and positioned supine. We then performed a timeout, verifying the correct patient's site and procedure to be performed.    Port placement was performed in the usual fashion includin mm supraumbilical camera port via Veress needle; Two left lateral robotic ports 8mm under direct vision; One right lateral robotic port under direct vision; 12mm right lateral assist port under direct  vision. The robot was then docked. Sigmoid adhesions were taken down from the left pelvic side wall and inguinal area. The bladder was then dissected free from the anterior abdominal wall. The endopelvic fascia was cleared using electrocautery. The superficial dorsal vein was controlled with electrocautery. The endopelvic fascia was then incised sharply on both sides of the prostate to expose the dorsal venous complex. The dorsal vein was then ligated with 0 Monocryl suture. The bladder neck was then dissected free from the prostate with care not to damage the ureteral orifices. The vas and seminal vesicles were dissected free from their posterior prostate attachments. Dissection was carried further posteriorly, exposing Denonvillier's fascia, which was incised and used to develop a plane to the prostatic apex posteriorly. The prostatic pedicles bilaterally were divided with Weck clips and scissors and bilateral nerve sparing was performed. The dorsal vein complex was then divided with electrocautery and urethra transected sharply to completely free the prostate.       The lymph nodes were dissected in the usual fashion with electrocautery bilaterally. The boundaries of the dissection included the bifurcation of the common iliac vein to the external iliac vein posteriorly, pelvic side wall laterally and anteriorly, and the obturator nerve inferiorly. The specimen was placed in an endocatch bag.       The anastomosis was then created between the bladder neck and the urethra using 3-0 V-lock suture. This was completed in a running fashion. A fresh 18Fr [place]-tip Roper catheter was then inserted, the balloon was inflated with 15 cc water, and the bladder was filled with normal saline. The vesicourethral anastomosis was noted to be watertight.      ANDRIA was placed in the left lateral pelvic gutter and brought out through the lateral port site. The assistant port was closed with a Robert-Guerrero device. The remaining  ports were removed under direct vision with no bleeding noted from the abdominal wall, and the abdomen was desufflated. The midline camera port site was extended and the specimen was extracted. The fascia was then closed with 0 PDS. All port sites were irrigated with normal saline. 30 cc marcaine was injected for incisional analgesia. The skin was closed with 4-0 monocyl in a running subcuticular fashion and skin glue was applied.      At the completion of the procedure, all surgical counts were correct.       The patient tolerated the procedure well, and he was awakened from anesthesia, extubated and transferred to the PACU in stable condition.     Avila Whaley MD   Urology  [facility]  Clinic Phone [phone]

## 2021-11-22 NOTE — ANESTHESIA PREPROCEDURE EVALUATION
Anesthesia Pre-Procedure Evaluation    Patient: Francoise Valerio   MRN: [id number] : 1955        Preoperative Diagnosis: Prostate cancer (H) [C61]    Procedure : Procedure(s):  ROBOTIC ASSISTED LAPAROSCOPIC RADICAL PROSTATECTOMY WITH BILATERAL PELVIC LYMPH NODE DISSECTION - POSSIBLE OPEN          Past Medical History:   Diagnosis Date     Anemia, unspecified      Extrinsic asthma, unspecified      Other disorders of bone and cartilage(733.99)     polychondritis on the ear     Other specified disorders of pancreatic internal secretion       Past Surgical History:   Procedure Laterality Date     PROSTATE SURGERY        Allergies   Allergen Reactions     Penicillins       Social History     Tobacco Use     Smoking status: Never Smoker     Smokeless tobacco: Never Used   Substance Use Topics     Alcohol use: No      Wt Readings from Last 1 Encounters:   21 68 kg (150 lb)        Anesthesia Evaluation   Pt has not had prior anesthetic     No history of anesthetic complications       ROS/MED HX  ENT/Pulmonary:    (-) tobacco use, asthma and sleep apnea   Neurologic: Comment: Left ear deaf   (-) no TIA   Cardiovascular:    (-) ONEILL, orthopnea/PND and syncope   METS/Exercise Tolerance: >4 METS    Hematologic:    (-) history of blood clots   Musculoskeletal: Comment: relapsing polychondritis       GI/Hepatic:    (-) GERD   Renal/Genitourinary:    (-) renal disease   Endo: Comment: Pre DM   (-) Type II DM   Psychiatric/Substance Use:       Infectious Disease:    (-) Recent Fever   Malignancy:   (+) Malignancy, History of Prostate.    Other: Comment: Relapsing polychondritis-ears, nose, last prednisone 10 years ago.  Denies other involvement  of resp, CV, CNS, hematologic.           Physical Exam    Airway        Mallampati: II   TM distance: > 3 FB   Neck ROM: full   Mouth opening: > 3 cm    Respiratory Devices and Support         Dental     Comment: Teeth poor condition, Mr Valerio understands that any injury to his teeth  he is responsible for the repair.     (+) missing and loose    B=Bridge, C=Chipped, L=Loose, M=Missing    Cardiovascular          Rhythm and rate: regular     Pulmonary           breath sounds clear to auscultation           OUTSIDE LABS:  CBC:   Lab Results   Component Value Date    WBC 6.0 02/06/2020    WBC 5.2 03/14/2019    HGB 13.2 (L) 11/02/2021    HGB 13.2 (L) 02/06/2020    HCT 41.3 02/06/2020    HCT 40.3 03/14/2019     02/06/2020     03/14/2019     BMP:   Lab Results   Component Value Date     05/07/2021     02/06/2020    POTASSIUM 4.1 05/07/2021    POTASSIUM 4.1 02/06/2020    CHLORIDE 105 05/07/2021    CHLORIDE 108 02/06/2020    CO2 30 05/07/2021    CO2 32 02/06/2020    BUN 13 05/07/2021    BUN 15 02/06/2020    CR 0.78 05/07/2021    CR 0.76 02/06/2020     (H) 05/07/2021     (H) 02/06/2020     COAGS: No results found for: PTT, INR, FIBR  POC: No results found for: BGM, HCG, HCGS  HEPATIC:   Lab Results   Component Value Date    ALBUMIN 3.9 05/07/2021    PROTTOTAL 7.1 05/07/2021    ALT 44 05/07/2021    AST 23 05/07/2021    ALKPHOS 52 05/07/2021    BILITOTAL 0.9 05/07/2021     OTHER:   Lab Results   Component Value Date    A1C 6.0 (H) 02/06/2020    MIS 8.6 05/07/2021    TSH 1.26 01/21/2015    SED 35 (H) 01/08/2003     Prior to Admission medications    Not on File     Current Facility-Administered Medications Ordered in Epic   Medication Dose Route Frequency Last Rate Last Admin     clindamycin (CLEOCIN) infusion 900 mg  900 mg Intravenous Pre-Op/Pre-procedure x 1 dose         clindamycin (CLEOCIN) infusion 900 mg  900 mg Intravenous See Admin Instructions         gentamicin (GARAMYCIN) infusion 80 mg  80 mg Intravenous Pre-Op/Pre-procedure x 1 dose         heparin ANTICOAGULANT injection 5,000 Units  5,000 Units Subcutaneous Pre-Op/Pre-procedure x 1 dose         lactated ringers infusion   Intravenous Continuous         lidocaine 1 % 0.1-1 mL  0.1-1 mL Other Q1H PRN          No current [facility]-ordered outpatient medications on file.       lactated ringers       No results for input(s): ABO, RH in the last 16422 hours.  No results for input(s): HCG in the last 22572 hours.  No results found for this or any previous visit (from the past 744 hour(s)).    Anesthesia Plan    ASA Status:  2      Anesthesia Type: General.     - Airway: ETT         Techniques and Equipment:     - Airway: Video-Laryngoscope         Consents    Anesthesia Plan(s) and associated risks, benefits, and realistic alternatives discussed. Questions answered and patient/representative(s) expressed understanding.    - Discussed:     - Discussed with:  Patient    Use of blood products discussed: Yes.     Postoperative Care       PONV prophylaxis: Ondansetron (or other 5HT-3), Dexamethasone or Solumedrol     Comments:    Other Comments: isabel Prieto MD

## 2021-11-23 VITALS
WEIGHT: 145.5 LBS | OXYGEN SATURATION: 97 % | HEIGHT: 68 IN | RESPIRATION RATE: 18 BRPM | BODY MASS INDEX: 22.05 KG/M2 | HEART RATE: 79 BPM | DIASTOLIC BLOOD PRESSURE: 79 MMHG | SYSTOLIC BLOOD PRESSURE: 134 MMHG | TEMPERATURE: 98.3 F

## 2021-11-23 LAB
ANION GAP SERPL CALCULATED.3IONS-SCNC: 4 MMOL/L (ref 3–14)
BUN SERPL-MCNC: 15 MG/DL (ref 7–30)
CALCIUM SERPL-MCNC: 8.1 MG/DL (ref 8.5–10.1)
CHLORIDE BLD-SCNC: 103 MMOL/L (ref 94–109)
CO2 SERPL-SCNC: 27 MMOL/L (ref 20–32)
CREAT SERPL-MCNC: 0.76 MG/DL (ref 0.66–1.25)
ERYTHROCYTE [DISTWIDTH] IN BLOOD BY AUTOMATED COUNT: 15 % (ref 10–15)
GFR SERPL CREATININE-BSD FRML MDRD: >90 ML/MIN/1.73M2
GLUCOSE BLD-MCNC: 137 MG/DL (ref 70–99)
GLUCOSE BLDC GLUCOMTR-MCNC: 140 MG/DL (ref 70–99)
HCT VFR BLD AUTO: 35.3 % (ref 40–53)
HGB BLD-MCNC: 11.2 G/DL (ref 13.3–17.7)
MCH RBC QN AUTO: 21.3 PG (ref 26.5–33)
MCHC RBC AUTO-ENTMCNC: 31.7 G/DL (ref 31.5–36.5)
MCV RBC AUTO: 67 FL (ref 78–100)
PLATELET # BLD AUTO: 183 10E3/UL (ref 150–450)
POTASSIUM BLD-SCNC: 3.7 MMOL/L (ref 3.4–5.3)
RBC # BLD AUTO: 5.25 10E6/UL (ref 4.4–5.9)
SODIUM SERPL-SCNC: 134 MMOL/L (ref 133–144)
WBC # BLD AUTO: 10.3 10E3/UL (ref 4–11)

## 2021-11-23 PROCEDURE — 36415 COLL VENOUS BLD VENIPUNCTURE: CPT | Performed by: STUDENT IN AN ORGANIZED HEALTH CARE EDUCATION/TRAINING PROGRAM

## 2021-11-23 PROCEDURE — 82962 GLUCOSE BLOOD TEST: CPT

## 2021-11-23 PROCEDURE — 250N000013 HC RX MED GY IP 250 OP 250 PS 637: Performed by: STUDENT IN AN ORGANIZED HEALTH CARE EDUCATION/TRAINING PROGRAM

## 2021-11-23 PROCEDURE — 250N000011 HC RX IP 250 OP 636: Performed by: STUDENT IN AN ORGANIZED HEALTH CARE EDUCATION/TRAINING PROGRAM

## 2021-11-23 PROCEDURE — 96372 THER/PROPH/DIAG INJ SC/IM: CPT | Performed by: STUDENT IN AN ORGANIZED HEALTH CARE EDUCATION/TRAINING PROGRAM

## 2021-11-23 PROCEDURE — 85014 HEMATOCRIT: CPT | Performed by: STUDENT IN AN ORGANIZED HEALTH CARE EDUCATION/TRAINING PROGRAM

## 2021-11-23 PROCEDURE — 82374 ASSAY BLOOD CARBON DIOXIDE: CPT | Performed by: STUDENT IN AN ORGANIZED HEALTH CARE EDUCATION/TRAINING PROGRAM

## 2021-11-23 RX ORDER — HEPARIN SODIUM 5000 [USP'U]/.5ML
5000 INJECTION, SOLUTION INTRAVENOUS; SUBCUTANEOUS EVERY 8 HOURS
Status: DISCONTINUED | OUTPATIENT
Start: 2021-11-23 | End: 2021-11-23 | Stop reason: HOSPADM

## 2021-11-23 RX ORDER — ONDANSETRON 2 MG/ML
4 INJECTION INTRAMUSCULAR; INTRAVENOUS EVERY 6 HOURS PRN
Status: DISCONTINUED | OUTPATIENT
Start: 2021-11-23 | End: 2021-11-23 | Stop reason: HOSPADM

## 2021-11-23 RX ORDER — CALCIUM CARBONATE 500 MG/1
500 TABLET, CHEWABLE ORAL DAILY PRN
Status: DISCONTINUED | OUTPATIENT
Start: 2021-11-23 | End: 2021-11-23 | Stop reason: HOSPADM

## 2021-11-23 RX ORDER — ACETAMINOPHEN 325 MG/1
650 TABLET ORAL
Status: DISCONTINUED | OUTPATIENT
Start: 2021-11-23 | End: 2021-11-23 | Stop reason: HOSPADM

## 2021-11-23 RX ADMIN — ACETAMINOPHEN 650 MG: 325 TABLET, FILM COATED ORAL at 10:35

## 2021-11-23 RX ADMIN — HEPARIN SODIUM 5000 UNITS: 5000 INJECTION INTRAVENOUS; SUBCUTANEOUS at 16:46

## 2021-11-23 RX ADMIN — ACETAMINOPHEN 650 MG: 325 TABLET, FILM COATED ORAL at 16:45

## 2021-11-23 RX ADMIN — HEPARIN SODIUM 5000 UNITS: 5000 INJECTION INTRAVENOUS; SUBCUTANEOUS at 10:10

## 2021-11-23 RX ADMIN — KETOROLAC TROMETHAMINE 15 MG: 15 INJECTION, SOLUTION INTRAMUSCULAR; INTRAVENOUS at 13:45

## 2021-11-23 ASSESSMENT — MIFFLIN-ST. JEOR: SCORE: 1419.48

## 2021-11-23 NOTE — PROVIDER NOTIFICATION
MD Notification    Notified Person: MD    Notified Person Name: Dr. Phan    Notification Date/Time: November 23, 2021 0330    Notification Interaction: TORB    Purpose of Notification: Pt requesting antiemetic and tums    Orders Received: Zofran 4mg q 6 hr PRN & Tums daily 500 mg     Comments:

## 2021-11-23 NOTE — PROGRESS NOTES
"Urology Daily Progress Note    24 hour events/Subjective:     - Large volume vomit last night after drinking lots of fluid, tolerated broth this AM   - Pain controlled without narcotics per patient's goal   - Has not yet walked, passing gas    O:  Vitals: /76 (BP Location: Right arm)   Pulse 83   Temp 98.1  F (36.7  C) (Oral)   Resp 18   Ht 1.727 m (5' 8\")   Wt 66 kg (145 lb 8 oz)   SpO2 97%   BMI 22.12 kg/m     - Temp (24hrs), Av.2  F (36.8  C), Min:97  F (36.1  C), Max:98.8  F (37.1  C)      General: Alert, interactive, in NAD  Resp: Non-labored breathing on RA  Abdomen: Soft, appropriately tender, incisions cdi  Ext: Warm and well perfused   Roper/Urostomy: Clear yellow   Drain: Serosanguinous    I/O  I/O last 3 completed shifts:  In: 2700 [P.O.:700; I.V.:2000]  Out: 2690 [Urine:1650; Emesis/NG output:700; Drains:240; Blood:100] - Last 24 hours    No intake/output data recorded. - Since Midnight      Heme:  Recent Labs   Lab 21  0712   WBC 10.3   HGB 11.2*        Chem:  Recent Labs   Lab 21  0712   POTASSIUM 3.7   CR 0.76     INRNo lab results found in last 7 days.     Assessment/Plan  66 year old y/o male POD#1  s/p RALP recovering well    - Ambulate then home today      Discussed with Dr. Whaley  --  Sarai Guerrero MD  Urology Resident    "

## 2021-11-23 NOTE — ANESTHESIA CARE TRANSFER NOTE
Patient: Francoise Valerio    Procedure: Procedure(s):  ROBOTIC ASSISTED LAPAROSCOPIC RADICAL PROSTATECTOMY WITH BILATERAL PELVIC LYMPH NODE DISSECTION       Diagnosis: Prostate cancer (H) [C61]  Diagnosis Additional Information: No value filed.    Anesthesia Type:   General     Note:    Oropharynx: oropharynx clear of all foreign objects  Level of Consciousness: awake  Oxygen Supplementation: face mask  Level of Supplemental Oxygen (L/min / FiO2): 6  Independent Airway: airway patency satisfactory and stable  Dentition: dentition unchanged  Vital Signs Stable: post-procedure vital signs reviewed and stable  Report to RN Given: handoff report given  Patient transferred to: PACU    Handoff Report: Identifed the Patient, Identified the Reponsible Provider, Reviewed the pertinent medical history, Discussed the surgical course, Reviewed Intra-OP anesthesia mangement and issues during anesthesia, Set expectations for post-procedure period and Allowed opportunity for questions and acknowledgement of understanding      Vitals:  Vitals Value Taken Time   /73 11/22/21 1758   Temp     Pulse 70 11/22/21 1800   Resp 19 11/22/21 1800   SpO2 100 % 11/22/21 1800   Vitals shown include unvalidated device data.    Electronically Signed By: TACHO Dowling CRNA  November 22, 2021  6:02 PM

## 2021-11-23 NOTE — PLAN OF CARE
Summary:   11-23-21 1900-0730    Patient came up from the PACU around 1930. Admission finished. A/O x4. VSS on room air. Denied any pain or SOB. Not OOB during shift. Patient had large intake of water and Gatorade. had episode of GERD and large amount of liquid brownish red emesis. Doc paged for antinausea med's. Order for zofran and TUMs. Roper in place good output. No numbness tingling good pulses. Continue to monitor. Discharge pending.

## 2021-11-24 LAB
PATH REPORT.COMMENTS IMP SPEC: NORMAL
PATH REPORT.COMMENTS IMP SPEC: NORMAL
PATH REPORT.FINAL DX SPEC: NORMAL
PATH REPORT.GROSS SPEC: NORMAL
PATH REPORT.MICROSCOPIC SPEC OTHER STN: NORMAL
PATHOLOGY SYNOPTIC REPORT: NORMAL
PHOTO IMAGE: NORMAL

## 2021-11-24 PROCEDURE — 88309 TISSUE EXAM BY PATHOLOGIST: CPT | Mod: 26 | Performed by: PATHOLOGY

## 2021-11-24 PROCEDURE — 88307 TISSUE EXAM BY PATHOLOGIST: CPT | Mod: 26 | Performed by: PATHOLOGY

## 2021-11-24 NOTE — PLAN OF CARE
65 YO male . POD1. Tolerating diet. Aox4. VSS on RA. Denies pain. Ambulated in the halls, steady. Tolerating regular diet well. Roper draining and in place at discharge. ANDRIA removed prior to discharge with dressing placed. Discharge teaching on Roper catheter, patient demonstrated understanding, wife present. Discharged with leg bag, alcohol pads, abd pads for dressing and tape. Script sent with patient. Discharged home with all personal belongings and transported by wife.

## 2021-12-06 ENCOUNTER — OFFICE VISIT (OUTPATIENT)
Dept: UROLOGY | Facility: CLINIC | Age: 66
End: 2021-12-06
Payer: COMMERCIAL

## 2021-12-06 VITALS — WEIGHT: 145 LBS | HEIGHT: 68 IN | BODY MASS INDEX: 21.98 KG/M2 | HEART RATE: 112 BPM | OXYGEN SATURATION: 98 %

## 2021-12-06 DIAGNOSIS — C61 PROSTATE CANCER (H): Primary | ICD-10-CM

## 2021-12-06 PROCEDURE — 99024 POSTOP FOLLOW-UP VISIT: CPT | Performed by: UROLOGY

## 2021-12-06 RX ORDER — CIPROFLOXACIN 500 MG/1
500 TABLET, FILM COATED ORAL 2 TIMES DAILY
COMMUNITY
End: 2022-06-17

## 2021-12-06 ASSESSMENT — PAIN SCALES - GENERAL: PAINLEVEL: NO PAIN (0)

## 2021-12-06 ASSESSMENT — MIFFLIN-ST. JEOR: SCORE: 1417.22

## 2021-12-06 NOTE — PROGRESS NOTES
"[facility]   CHIEF COMPLAINT   It was my pleasure to see Francoise Valerio who is a 66 year old male for follow-up of ELEVATED PSA .      HPI   Francoise Valerio is a very pleasant 66 year old male    Initial PSA: 9.31  Biopsy [place] Score: 3 + 4 = 7  Risk Group: GG2  Status post RALP/BPLND on 11/22/2021  Age at time of surgery: 66  Pathologic Cuca Score: 3 + 4 = 7  Positive Margins: None  Extra Capsular Extension: None  SV Involvement: None  Pathologic Stage: pT2  Number of Lymph Nodes Removed: 6  Number of Positive Lymph Nodes: 0  Nerve status: Bilateral nerve sparing    He is doing very well after surgery with no issues  He notes very minimal pain did not require much pain medication    PHYSICAL EXAM  Patient is a 66 year old  male   Vitals: Pulse 112, height 1.727 m (5' 8\"), weight 65.8 kg (145 lb), SpO2 98 %.  Body mass index is 22.05 kg/m .  General Appearance Adult:   Alert, no acute distress, oriented  HENT: throat/mouth:normal, good dentition  Lungs: no respiratory distress, or pursed lip breathing  Heart: No obvious jugular venous distension present  Abdomen: soft, nontender, no organomegaly or masses  Incisions: clean, dry and intact, healing well  Musculoskeltal: extremities normal, no peripheral edema  Skin: no suspicious lesions or rashes  Neuro: Alert, oriented, speech and mentation normal  Psych: affect and mood normal  Gait: Normal    Component PSA   Latest Ref Rng & Units 0 - 4 ug/L   2/2/2017 5.94 (H)   2/19/2018 5.39 (H)   3/14/2019 6.36 (H)   2/6/2020 7.06 (H)   5/7/2021 9.31 (H)     Final Diagnosis   A(A). Prostate, LEFT LATERAL BASE, Prostate needle biopsy:  - Benign prostatic tissue. Negative for malignancy.     B(B). Prostate, LEFT LATERAL MID, Prostate needle biopsy:  - Benign prostatic tissue. Negative for malignancy.     C(C). Prostate, LEFT LATERAL APEX, Prostate needle biopsy:  - Benign prostatic tissue. Negative for malignancy.     D(D). Prostate, LEFT BASE, Prostate needle biopsy:  - Benign " prostatic tissue. Negative for malignancy.     E(E). Prostate, LEFT MID, Prostate needle biopsy:  - Benign prostatic tissue. Negative for malignancy.     F(F). Prostate, LEFT APEX, Prostate needle biopsy:  -High-grade prostatic intraepithelial neoplasia.  Negative for invasive malignancy     G(G). Prostate, RIGHT LATERAL BASE, Prostate needle biopsy:  - Benign prostatic tissue. Negative for malignancy.     H(H). Prostate, RIGHT LATERAL MID, Prostate needle biopsy:  - Benign prostatic tissue. Negative for malignancy.     I(I). Prostate, RIGHT LATERAL APEX, Prostate needle biopsy:  - - Prostatic adenocarcinoma, acinar type, Sopchoppy's grade 3+4=7, grade group is 2, number of cores involved is 1 of 1, % of grade 4 is <10 %,total surface area involved is 10-15 %,perineural invasion is not identified     J(J). Prostate, RIGHT BASE, Prostate needle biopsy:  -Benign prostatic tissue.  Negative for malignancy.     K(K). Prostate, RIGHT MID, Prostate needle biopsy:  -Focal high-grade prostatic intraepithelial neoplasia.  Negative for invasive malignancy.     L(L). Prostate, RIGHT APEX, Prostate needle biopsy:  - Prostatic adenocarcinoma, acinar type, Cuca's grade 3+4=7, grade group is 2, number of cores involved is 1 of 1, % of grade 4 is 40 %,total surface area involved is 80 %,perineural invasion is  identified     M(M). Prostate, RIGHT LESION (X4), Prostate needle biopsy:  - Prostatic adenocarcinoma, acinar type, Cuca's grade 3+4=7, grade group is 2, number of cores involved is 3 of 4, % of grade 4 is 30 %,total surface area involved is 30%,perineural invasion is  identified     RALP:  Final Diagnosis   A: Prostate, radical prostatectomy:  - Adenocarcinoma, acinar type, grade group 2 ([place] score 3 + 4 = 7 of 10).  - Proportion of [place] pattern 4: 40%.  - Inked surgical margins negative for tumor (nearest inked margin: right apex, 0.1 mm).  - Positive for perineural invasion.  - Negative for extraprostatic  extension.  - Negative for lymphovascular invasion.  - Negative for urinary bladder neck invasion.  - Negative for seminal vesicle invasion.     B: Lymph nodes, bilateral pelvic, biopsy:  - Negative for malignancy (6 lymph nodes).    pT2N0       IMAGING:  All pertinent imaging reviewed:    All imaging studies reviewed by me.  I personally reviewed these imaging films.  A formal report from radiology will follow.    MRI PROSTATE 7/23/21:  FINDINGS:  Size:  35.7 grams  Hemorrhage: Absent  Peripheral zone: Heterogeneous on T2-weighted images. Suspicious  lesions as detailed below.  Transition zone: Enlarged with BPH changes. Transition zone nodules  which are circumscribed or mostly encapsulated without diffusion  restriction.  PI-RADS 2.  No highly suspicious nodules.     Lesion(s) in rank order of severity (highest score- to lowest score,  then by size)      Lesion 1:  Location: Right apex peripheral zone at the 10-12 o'clock position  relative to the urethra. Series 14 image 21.  Series 7, image 65  Size: 14 mm  T2 description: Not circumscribed, moderate hypointensity  T2 numerical assessment: 3  DWI description: Focal markedly hypointense on ADC and markedly  hyperintense on high B-value DWI.  DWI numerical assessment: 4  DCE assessment: Positive    Prostate margin: Capsular abutment 6-15 mm  Lesion overall PI-RADS category: 4     Neurovascular bundles: No suspicion of involvement by malignancy  Seminal vesicles: Not involved by tumor.  Lymph nodes: No significant adenopathy. Subcentimeter bilateral  external iliac lymph nodes (series 19, image 26), measuring 4 mm on  the right and 3 mm on the left. Subcentimeter bilateral inguinal lymph  nodes likely reactive  Bones: No suspicious lesions.  Other pelvic organs: No additional findings. Colonic diverticulosis..  Small fat-containing left inguinal hernia.                                            IMPRESSION:  1. Based on the most suspicious abnormality, this exam  is  characterized as PIRADS 4 - Clinically significant cancer is likely to  be present.?The most suspicious abnormality is located at the right  apex peripheral zone, the 10:00 to 12:00 position and there is short  segment capsular abutment with low likelihood of minimal  extraprostatic extension.  2. No suspicious adenopathy or evidence of pelvic metastases.    ASSESSMENT and PLAN  66-year-old man with elevated PSA at 9.31 and now with an MRI with a concerning PI-RADS 4 lesion with low likelihood of minimal extraprostatic extension there is no evidence of adenopathy.  Now status post a robotic prostatectomy on 11/22/2021 with Cuca 3+4 = 7 prostate cancer, pT2N0, negative margins    -He did very well throughout the surgical.  -Roper catheter removed today and we discussed the expected recovery process  -Order for physical therapy placed  -Follow-up in 3 months for PSA    Time spent: 30 minutes spent on the date of the encounter doing chart review, history and exam, documentation and further activities as noted above.    Avila Whaley MD   Urology  [facility]  [facility] Phone: [phone]  [facility] Phone: [phone]

## 2021-12-06 NOTE — LETTER
[facility] UROLOGY [facility] [place]  [address]  [address]  [place] [place] [address]  Phone: [phone]  Fax: [phone]    December 6, 2021        Francoise Valerio  [address]  [place] [address]          To whom it may concern:    RE: Francoise Valerio is a patient of mine who is status post a surgery on 11/22/21. Patient may return to work - however this should be from home to allow for full recovery and healing. This should be for a time span of 3-6 months.     Please contact me for questions or concerns.      Sincerely,        Avila Whaley MD

## 2021-12-06 NOTE — LETTER
"12/6/2021       RE: Francoise Valerio  [address]  [place] [address]     Dear Colleague,    Thank you for referring your patient, Francoise Valerio, to the [facility] [place] at [facility]. Please see a copy of my visit note below.    [place]   CHIEF COMPLAINT   It was my pleasure to see Francoise Valerio who is a 66 year old male for follow-up of ELEVATED PSA .      HPI   Francoise Valerio is a very pleasant 66 year old male    Initial PSA: 9.31  Biopsy Cuca Score: 3 + 4 = 7  Risk Group: GG2  Status post RALP/BPLND on 11/22/2021  Age at time of surgery: 66  Pathologic Cuca Score: 3 + 4 = 7  Positive Margins: None  Extra Capsular Extension: None  SV Involvement: None  Pathologic Stage: pT2  Number of Lymph Nodes Removed: 6  Number of Positive Lymph Nodes: 0  Nerve status: Bilateral nerve sparing    He is doing very well after surgery with no issues  He notes very minimal pain did not require much pain medication    PHYSICAL EXAM  Patient is a 66 year old  male   Vitals: Pulse 112, height 1.727 m (5' 8\"), weight 65.8 kg (145 lb), SpO2 98 %.  Body mass index is 22.05 kg/m .  General Appearance Adult:   Alert, no acute distress, oriented  HENT: throat/mouth:normal, good dentition  Lungs: no respiratory distress, or pursed lip breathing  Heart: No obvious jugular venous distension present  Abdomen: soft, nontender, no organomegaly or masses  Incisions: clean, dry and intact, healing well  Musculoskeltal: extremities normal, no peripheral edema  Skin: no suspicious lesions or rashes  Neuro: Alert, oriented, speech and mentation normal  Psych: affect and mood normal  Gait: Normal    Component PSA   Latest Ref Rng & Units 0 - 4 ug/L   2/2/2017 5.94 (H)   2/19/2018 5.39 (H)   3/14/2019 6.36 (H)   2/6/2020 7.06 (H)   5/7/2021 9.31 (H)     Final Diagnosis   A(A). Prostate, LEFT LATERAL BASE, Prostate needle biopsy:  - Benign prostatic tissue. Negative for " malignancy.     B(B). Prostate, LEFT LATERAL MID, Prostate needle biopsy:  - Benign prostatic tissue. Negative for malignancy.     C(C). Prostate, LEFT LATERAL APEX, Prostate needle biopsy:  - Benign prostatic tissue. Negative for malignancy.     D(D). Prostate, LEFT BASE, Prostate needle biopsy:  - Benign prostatic tissue. Negative for malignancy.     E(E). Prostate, LEFT MID, Prostate needle biopsy:  - Benign prostatic tissue. Negative for malignancy.     F(F). Prostate, LEFT APEX, Prostate needle biopsy:  -High-grade prostatic intraepithelial neoplasia.  Negative for invasive malignancy     G(G). Prostate, RIGHT LATERAL BASE, Prostate needle biopsy:  - Benign prostatic tissue. Negative for malignancy.     H(H). Prostate, RIGHT LATERAL MID, Prostate needle biopsy:  - Benign prostatic tissue. Negative for malignancy.     I(I). Prostate, RIGHT LATERAL APEX, Prostate needle biopsy:  - - Prostatic adenocarcinoma, acinar type, [place]'s grade 3+4=7, grade group is 2, number of cores involved is 1 of 1, % of grade 4 is <10 %,total surface area involved is 10-15 %,perineural invasion is not identified     J(J). Prostate, RIGHT BASE, Prostate needle biopsy:  -Benign prostatic tissue.  Negative for malignancy.     K(K). Prostate, RIGHT MID, Prostate needle biopsy:  -Focal high-grade prostatic intraepithelial neoplasia.  Negative for invasive malignancy.     L(L). Prostate, RIGHT APEX, Prostate needle biopsy:  - Prostatic adenocarcinoma, acinar type, Livingston's grade 3+4=7, grade group is 2, number of cores involved is 1 of 1, % of grade 4 is 40 %,total surface area involved is 80 %,perineural invasion is  identified     M(M). Prostate, RIGHT LESION (X4), Prostate needle biopsy:  - Prostatic adenocarcinoma, acinar type, Cuca's grade 3+4=7, grade group is 2, number of cores involved is 3 of 4, % of grade 4 is 30 %,total surface area involved is 30%,perineural invasion is  identified     RALP:  Final Diagnosis   A:  Prostate, radical prostatectomy:  - Adenocarcinoma, acinar type, grade group 2 (Cuca score 3 + 4 = 7 of 10).  - Proportion of Cuca pattern 4: 40%.  - Inked surgical margins negative for tumor (nearest inked margin: right apex, 0.1 mm).  - Positive for perineural invasion.  - Negative for extraprostatic extension.  - Negative for lymphovascular invasion.  - Negative for urinary bladder neck invasion.  - Negative for seminal vesicle invasion.     B: Lymph nodes, bilateral pelvic, biopsy:  - Negative for malignancy (6 lymph nodes).    pT2N0       IMAGING:  All pertinent imaging reviewed:    All imaging studies reviewed by me.  I personally reviewed these imaging films.  A formal report from radiology will follow.    MRI PROSTATE 7/23/21:  FINDINGS:  Size:  35.7 grams  Hemorrhage: Absent  Peripheral zone: Heterogeneous on T2-weighted images. Suspicious  lesions as detailed below.  Transition zone: Enlarged with BPH changes. Transition zone nodules  which are circumscribed or mostly encapsulated without diffusion  restriction.  PI-RADS 2.  No highly suspicious nodules.     Lesion(s) in rank order of severity (highest score- to lowest score,  then by size)      Lesion 1:  Location: Right apex peripheral zone at the 10-12 o'clock position  relative to the urethra. Series 14 image 21.  Series 7, image 65  Size: 14 mm  T2 description: Not circumscribed, moderate hypointensity  T2 numerical assessment: 3  DWI description: Focal markedly hypointense on ADC and markedly  hyperintense on high B-value DWI.  DWI numerical assessment: 4  DCE assessment: Positive    Prostate margin: Capsular abutment 6-15 mm  Lesion overall PI-RADS category: 4     Neurovascular bundles: No suspicion of involvement by malignancy  Seminal vesicles: Not involved by tumor.  Lymph nodes: No significant adenopathy. Subcentimeter bilateral  external iliac lymph nodes (series 19, image 26), measuring 4 mm on  the right and 3 mm on the left.  Subcentimeter bilateral inguinal lymph  nodes likely reactive  Bones: No suspicious lesions.  Other pelvic organs: No additional findings. Colonic diverticulosis..  Small fat-containing left inguinal hernia.                                            IMPRESSION:  1. Based on the most suspicious abnormality, this exam is  characterized as PIRADS 4 - Clinically significant cancer is likely to  be present.?The most suspicious abnormality is located at the right  apex peripheral zone, the 10:00 to 12:00 position and there is short  segment capsular abutment with low likelihood of minimal  extraprostatic extension.  2. No suspicious adenopathy or evidence of pelvic metastases.    ASSESSMENT and PLAN  66-year-old man with elevated PSA at 9.31 and now with an MRI with a concerning PI-RADS 4 lesion with low likelihood of minimal extraprostatic extension there is no evidence of adenopathy.  Now status post a robotic prostatectomy on 11/22/2021 with [place] 3+4 = 7 prostate cancer, pT2N0, negative margins    -He did very well throughout the surgical.  -Roper catheter removed today and we discussed the expected recovery process  -Order for physical therapy placed  -Follow-up in 3 months for PSA    Time spent: 30 minutes spent on the date of the encounter doing chart review, history and exam, documentation and further activities as noted above.    Avila Whaley MD   Urology  [facility]  [facility] Phone: [phone]  [facility] Phone: [phone]        Chief Complaint   Patient presents with     Elevated PSA     had surgery, cath placed, here to be removed     Prostate Cancer     Munira Washington [facility]

## 2021-12-06 NOTE — PROGRESS NOTES
Chief Complaint   Patient presents with     Elevated PSA     had surgery, cath placed, here to be removed     Prostate Cancer     Munira Washington, CMA

## 2021-12-13 ENCOUNTER — TELEPHONE (OUTPATIENT)
Dept: FAMILY MEDICINE | Facility: CLINIC | Age: 66
End: 2021-12-13
Payer: COMMERCIAL

## 2021-12-13 NOTE — TELEPHONE ENCOUNTER
Pt is home for 3 weeks now post prostate removal surgery (cancer).  He has a COVID-19 booster scheduled for tomorrow. He wants to know if it's okay to get the booster. No fever, no pain, and incision is healing well. It has been 8 months since 2nd vaccine. Pt is asking if it's best to wait 4-6 wks? Advised he can get the booster if no active infection. Pt wants to hear directly from Dr. Kelley (primary) or Dr. Whaley (urology).

## 2022-03-07 ENCOUNTER — OFFICE VISIT (OUTPATIENT)
Dept: UROLOGY | Facility: CLINIC | Age: 67
End: 2022-03-07
Payer: COMMERCIAL

## 2022-03-07 VITALS
BODY MASS INDEX: 22.73 KG/M2 | HEIGHT: 68 IN | WEIGHT: 150 LBS | DIASTOLIC BLOOD PRESSURE: 70 MMHG | SYSTOLIC BLOOD PRESSURE: 130 MMHG

## 2022-03-07 DIAGNOSIS — C61 PROSTATE CANCER (H): Primary | ICD-10-CM

## 2022-03-07 LAB — PSA SERPL-MCNC: <0.04 UG/L (ref 0–4)

## 2022-03-07 PROCEDURE — 84153 ASSAY OF PSA TOTAL: CPT | Performed by: UROLOGY

## 2022-03-07 PROCEDURE — 36415 COLL VENOUS BLD VENIPUNCTURE: CPT | Performed by: UROLOGY

## 2022-03-07 PROCEDURE — 99213 OFFICE O/P EST LOW 20 MIN: CPT | Performed by: UROLOGY

## 2022-03-07 ASSESSMENT — PAIN SCALES - GENERAL: PAINLEVEL: NO PAIN (0)

## 2022-03-07 NOTE — LETTER
3/7/2022       RE: Francoise Valerio  [address]  [facility] [address]     Dear Colleague,    Thank you for referring your patient, Francoise Valerio, to the [facility] at [facility]. Please see a copy of my visit note below.    [place]   CHIEF COMPLAINT   It was my pleasure to see Francoise Valerio who is a 66 year old male for follow-up of ELEVATED PSA .      HPI   Francoise Valerio is a very pleasant 66 year old male    Initial PSA: 9.31  Biopsy Cuca Score: 3 + 4 = 7  Risk Group: GG2  Status post RALP/BPLND on 11/22/2021  Age at time of surgery: 66  Pathologic Weaver Score: 3 + 4 = 7  Positive Margins: None  Extra Capsular Extension: None  SV Involvement: None  Pathologic Stage: pT2  Number of Lymph Nodes Removed: 6  Number of Positive Lymph Nodes: 0  Nerve status: Bilateral nerve sparing    TODAY 3/7/22:  He is feeling really good  Urine control is making good progress, 1 ppd  At times will have some drips with walking  He did not schedule the physical therapy  Erections are working well and normally     PHYSICAL EXAM  Patient is a 66 year old  male   Vitals: There were no vitals taken for this visit.  There is no height or weight on file to calculate BMI.  General Appearance Adult:   Alert, no acute distress, oriented  HENT: throat/mouth:normal, good dentition  Lungs: no respiratory distress, or pursed lip breathing  Heart: No obvious jugular venous distension present  Abdomen: soft, nontender, no organomegaly or masses  Incisions: well healed, no evidence of hernia  Musculoskeltal: extremities normal, no peripheral edema  Skin: no suspicious lesions or rashes  Neuro: Alert, oriented, speech and mentation normal  Psych: affect and mood normal  Gait: Normal    Component PSA   Latest Ref Rng & Units 0.00 - 4.00 ug/L   2/2/2017 5.94 (H)   2/19/2018 5.39 (H)   3/14/2019 6.36 (H)   2/6/2020 7.06 (H)   5/7/2021 9.31 (H)   3/7/2022 <0.04     Final Diagnosis    A(A). Prostate, LEFT LATERAL BASE, Prostate needle biopsy:  - Benign prostatic tissue. Negative for malignancy.     B(B). Prostate, LEFT LATERAL MID, Prostate needle biopsy:  - Benign prostatic tissue. Negative for malignancy.     C(C). Prostate, LEFT LATERAL APEX, Prostate needle biopsy:  - Benign prostatic tissue. Negative for malignancy.     D(D). Prostate, LEFT BASE, Prostate needle biopsy:  - Benign prostatic tissue. Negative for malignancy.     E(E). Prostate, LEFT MID, Prostate needle biopsy:  - Benign prostatic tissue. Negative for malignancy.     F(F). Prostate, LEFT APEX, Prostate needle biopsy:  -High-grade prostatic intraepithelial neoplasia.  Negative for invasive malignancy     G(G). Prostate, RIGHT LATERAL BASE, Prostate needle biopsy:  - Benign prostatic tissue. Negative for malignancy.     H(H). Prostate, RIGHT LATERAL MID, Prostate needle biopsy:  - Benign prostatic tissue. Negative for malignancy.     I(I). Prostate, RIGHT LATERAL APEX, Prostate needle biopsy:  - - Prostatic adenocarcinoma, acinar type, Cuca's grade 3+4=7, grade group is 2, number of cores involved is 1 of 1, % of grade 4 is <10 %,total surface area involved is 10-15 %,perineural invasion is not identified     J(J). Prostate, RIGHT BASE, Prostate needle biopsy:  -Benign prostatic tissue.  Negative for malignancy.     K(K). Prostate, RIGHT MID, Prostate needle biopsy:  -Focal high-grade prostatic intraepithelial neoplasia.  Negative for invasive malignancy.     L(L). Prostate, RIGHT APEX, Prostate needle biopsy:  - Prostatic adenocarcinoma, acinar type, Millburn's grade 3+4=7, grade group is 2, number of cores involved is 1 of 1, % of grade 4 is 40 %,total surface area involved is 80 %,perineural invasion is  identified     M(M). Prostate, RIGHT LESION (X4), Prostate needle biopsy:  - Prostatic adenocarcinoma, acinar type, Cuca's grade 3+4=7, grade group is 2, number of cores involved is 3 of 4, % of grade 4 is 30 %,total  surface area involved is 30%,perineural invasion is  identified     RALP:  Final Diagnosis   A: Prostate, radical prostatectomy:  - Adenocarcinoma, acinar type, grade group 2 (Nice score 3 + 4 = 7 of 10).  - Proportion of Nice pattern 4: 40%.  - Inked surgical margins negative for tumor (nearest inked margin: right apex, 0.1 mm).  - Positive for perineural invasion.  - Negative for extraprostatic extension.  - Negative for lymphovascular invasion.  - Negative for urinary bladder neck invasion.  - Negative for seminal vesicle invasion.     B: Lymph nodes, bilateral pelvic, biopsy:  - Negative for malignancy (6 lymph nodes).    pT2N0       IMAGING:  All pertinent imaging reviewed:    All imaging studies reviewed by me.  I personally reviewed these imaging films.  A formal report from radiology will follow.    MRI PROSTATE 7/23/21:  FINDINGS:  Size:  35.7 grams  Hemorrhage: Absent  Peripheral zone: Heterogeneous on T2-weighted images. Suspicious  lesions as detailed below.  Transition zone: Enlarged with BPH changes. Transition zone nodules  which are circumscribed or mostly encapsulated without diffusion  restriction.  PI-RADS 2.  No highly suspicious nodules.     Lesion(s) in rank order of severity (highest score- to lowest score,  then by size)      Lesion 1:  Location: Right apex peripheral zone at the 10-12 o'clock position  relative to the urethra. Series 14 image 21.  Series 7, image 65  Size: 14 mm  T2 description: Not circumscribed, moderate hypointensity  T2 numerical assessment: 3  DWI description: Focal markedly hypointense on ADC and markedly  hyperintense on high B-value DWI.  DWI numerical assessment: 4  DCE assessment: Positive    Prostate margin: Capsular abutment 6-15 mm  Lesion overall PI-RADS category: 4     Neurovascular bundles: No suspicion of involvement by malignancy  Seminal vesicles: Not involved by tumor.  Lymph nodes: No significant adenopathy. Subcentimeter bilateral  external iliac  lymph nodes (series 19, image 26), measuring 4 mm on  the right and 3 mm on the left. Subcentimeter bilateral inguinal lymph  nodes likely reactive  Bones: No suspicious lesions.  Other pelvic organs: No additional findings. Colonic diverticulosis..  Small fat-containing left inguinal hernia.                                            IMPRESSION:  1. Based on the most suspicious abnormality, this exam is  characterized as PIRADS 4 - Clinically significant cancer is likely to  be present.?The most suspicious abnormality is located at the right  apex peripheral zone, the 10:00 to 12:00 position and there is short  segment capsular abutment with low likelihood of minimal  extraprostatic extension.  2. No suspicious adenopathy or evidence of pelvic metastases.    ASSESSMENT and PLAN  66-year-old man with elevated PSA at 9.31 and now with an MRI with a concerning PI-RADS 4 lesion with low likelihood of minimal extraprostatic extension there is no evidence of adenopathy.  Now status post a robotic prostatectomy on 11/22/2021 with [place] 3+4 = 7 prostate cancer, pT2N0, negative margins.    [facility] Post-prostatectomy nomogram:  Probability of remaining recurrence-free after surgery:  2 year = 97%  5 year = 91%  7 year = 88%  10 year = 83%    15 year prostate cancer-specific survival = 99%    Prostate cancer   -Reviewed his [facility] nomogram results  -Reviewed his PSA which is undetectable  -I reviewed his prior pathology results  -Follow-up in 3 months for PSA    Time spent: 20 minutes spent on the date of the encounter doing chart review, history and exam, documentation and further activities as noted above.    Avila Whaley MD   Urology  [facility]  [facility] Phone: [phone]  [facility] Phone: [phone]

## 2022-03-07 NOTE — PROGRESS NOTES
[place]   CHIEF COMPLAINT   It was my pleasure to see Francoise Valerio who is a 66 year old male for follow-up of ELEVATED PSA .      HPI   Francoise Valerio is a very pleasant 66 year old male    Initial PSA: 9.31  Biopsy [place] Score: 3 + 4 = 7  Risk Group: GG2  Status post RALP/BPLND on 11/22/2021  Age at time of surgery: 66  Pathologic Cuca Score: 3 + 4 = 7  Positive Margins: None  Extra Capsular Extension: None  SV Involvement: None  Pathologic Stage: pT2  Number of Lymph Nodes Removed: 6  Number of Positive Lymph Nodes: 0  Nerve status: Bilateral nerve sparing    TODAY 3/7/22:  He is feeling really good  Urine control is making good progress, 1 ppd  At times will have some drips with walking  He did not schedule the physical therapy  Erections are working well and normally     PHYSICAL EXAM  Patient is a 66 year old  male   Vitals: There were no vitals taken for this visit.  There is no height or weight on file to calculate BMI.  General Appearance Adult:   Alert, no acute distress, oriented  HENT: throat/mouth:normal, good dentition  Lungs: no respiratory distress, or pursed lip breathing  Heart: No obvious jugular venous distension present  Abdomen: soft, nontender, no organomegaly or masses  Incisions: well healed, no evidence of hernia  Musculoskeltal: extremities normal, no peripheral edema  Skin: no suspicious lesions or rashes  Neuro: Alert, oriented, speech and mentation normal  Psych: affect and mood normal  Gait: Normal    Component PSA   Latest Ref Rng & Units 0.00 - 4.00 ug/L   2/2/2017 5.94 (H)   2/19/2018 5.39 (H)   3/14/2019 6.36 (H)   2/6/2020 7.06 (H)   5/7/2021 9.31 (H)   3/7/2022 <0.04     Final Diagnosis   A(A). Prostate, LEFT LATERAL BASE, Prostate needle biopsy:  - Benign prostatic tissue. Negative for malignancy.     B(B). Prostate, LEFT LATERAL MID, Prostate needle biopsy:  - Benign prostatic tissue. Negative for malignancy.     C(C). Prostate, LEFT LATERAL APEX, Prostate needle biopsy:  -  Benign prostatic tissue. Negative for malignancy.     D(D). Prostate, LEFT BASE, Prostate needle biopsy:  - Benign prostatic tissue. Negative for malignancy.     E(E). Prostate, LEFT MID, Prostate needle biopsy:  - Benign prostatic tissue. Negative for malignancy.     F(F). Prostate, LEFT APEX, Prostate needle biopsy:  -High-grade prostatic intraepithelial neoplasia.  Negative for invasive malignancy     G(G). Prostate, RIGHT LATERAL BASE, Prostate needle biopsy:  - Benign prostatic tissue. Negative for malignancy.     H(H). Prostate, RIGHT LATERAL MID, Prostate needle biopsy:  - Benign prostatic tissue. Negative for malignancy.     I(I). Prostate, RIGHT LATERAL APEX, Prostate needle biopsy:  - - Prostatic adenocarcinoma, acinar type, Cuca's grade 3+4=7, grade group is 2, number of cores involved is 1 of 1, % of grade 4 is <10 %,total surface area involved is 10-15 %,perineural invasion is not identified     J(J). Prostate, RIGHT BASE, Prostate needle biopsy:  -Benign prostatic tissue.  Negative for malignancy.     K(K). Prostate, RIGHT MID, Prostate needle biopsy:  -Focal high-grade prostatic intraepithelial neoplasia.  Negative for invasive malignancy.     L(L). Prostate, RIGHT APEX, Prostate needle biopsy:  - Prostatic adenocarcinoma, acinar type, De Berry's grade 3+4=7, grade group is 2, number of cores involved is 1 of 1, % of grade 4 is 40 %,total surface area involved is 80 %,perineural invasion is  identified     M(M). Prostate, RIGHT LESION (X4), Prostate needle biopsy:  - Prostatic adenocarcinoma, acinar type, Cuca's grade 3+4=7, grade group is 2, number of cores involved is 3 of 4, % of grade 4 is 30 %,total surface area involved is 30%,perineural invasion is  identified     RALP:  Final Diagnosis   A: Prostate, radical prostatectomy:  - Adenocarcinoma, acinar type, grade group 2 (Cuca score 3 + 4 = 7 of 10).  - Proportion of De [place] pattern 4: 40%.  - Inked surgical margins negative for tumor  (nearest inked margin: right apex, 0.1 mm).  - Positive for perineural invasion.  - Negative for extraprostatic extension.  - Negative for lymphovascular invasion.  - Negative for urinary bladder neck invasion.  - Negative for seminal vesicle invasion.     B: Lymph nodes, bilateral pelvic, biopsy:  - Negative for malignancy (6 lymph nodes).    pT2N0       IMAGING:  All pertinent imaging reviewed:    All imaging studies reviewed by me.  I personally reviewed these imaging films.  A formal report from radiology will follow.    MRI PROSTATE 7/23/21:  FINDINGS:  Size:  35.7 grams  Hemorrhage: Absent  Peripheral zone: Heterogeneous on T2-weighted images. Suspicious  lesions as detailed below.  Transition zone: Enlarged with BPH changes. Transition zone nodules  which are circumscribed or mostly encapsulated without diffusion  restriction.  PI-RADS 2.  No highly suspicious nodules.     Lesion(s) in rank order of severity (highest score- to lowest score,  then by size)      Lesion 1:  Location: Right apex peripheral zone at the 10-12 o'clock position  relative to the urethra. Series 14 image 21.  Series 7, image 65  Size: 14 mm  T2 description: Not circumscribed, moderate hypointensity  T2 numerical assessment: 3  DWI description: Focal markedly hypointense on ADC and markedly  hyperintense on high B-value DWI.  DWI numerical assessment: 4  DCE assessment: Positive    Prostate margin: Capsular abutment 6-15 mm  Lesion overall PI-RADS category: 4     Neurovascular bundles: No suspicion of involvement by malignancy  Seminal vesicles: Not involved by tumor.  Lymph nodes: No significant adenopathy. Subcentimeter bilateral  external iliac lymph nodes (series 19, image 26), measuring 4 mm on  the right and 3 mm on the left. Subcentimeter bilateral inguinal lymph  nodes likely reactive  Bones: No suspicious lesions.  Other pelvic organs: No additional findings. Colonic diverticulosis..  Small fat-containing left inguinal  hernia.                                            IMPRESSION:  1. Based on the most suspicious abnormality, this exam is  characterized as PIRADS 4 - Clinically significant cancer is likely to  be present.?The most suspicious abnormality is located at the right  apex peripheral zone, the 10:00 to 12:00 position and there is short  segment capsular abutment with low likelihood of minimal  extraprostatic extension.  2. No suspicious adenopathy or evidence of pelvic metastases.    ASSESSMENT and PLAN  66-year-old man with elevated PSA at 9.31 and now with an MRI with a concerning PI-RADS 4 lesion with low likelihood of minimal extraprostatic extension there is no evidence of adenopathy.  Now status post a robotic prostatectomy on 11/22/2021 with Cuca 3+4 = 7 prostate cancer, pT2N0, negative margins.    [facility] Post-prostatectomy nomogram:  Probability of remaining recurrence-free after surgery:  2 year = 97%  5 year = 91%  7 year = 88%  10 year = 83%    15 year prostate cancer-specific survival = 99%    Prostate cancer   -Reviewed his [facility] nomogram results  -Reviewed his PSA which is undetectable  -I reviewed his prior pathology results  -Follow-up in 3 months for PSA    Time spent: 20 minutes spent on the date of the encounter doing chart review, history and exam, documentation and further activities as noted above.    Avila Whaley MD   Urology  [facility]  [facility] Phone: [phone]  [facility] Phone: [phone]

## 2022-06-15 DIAGNOSIS — C61 PROSTATE CANCER (H): Primary | ICD-10-CM

## 2022-06-17 ENCOUNTER — OFFICE VISIT (OUTPATIENT)
Dept: UROLOGY | Facility: CLINIC | Age: 67
End: 2022-06-17
Payer: COMMERCIAL

## 2022-06-17 VITALS
HEART RATE: 84 BPM | WEIGHT: 152 LBS | DIASTOLIC BLOOD PRESSURE: 74 MMHG | SYSTOLIC BLOOD PRESSURE: 138 MMHG | HEIGHT: 68 IN | BODY MASS INDEX: 23.04 KG/M2 | OXYGEN SATURATION: 97 %

## 2022-06-17 DIAGNOSIS — C61 PROSTATE CANCER (H): Primary | ICD-10-CM

## 2022-06-17 LAB — PSA SERPL-MCNC: <0.04 UG/L (ref 0–4)

## 2022-06-17 PROCEDURE — 99213 OFFICE O/P EST LOW 20 MIN: CPT | Performed by: UROLOGY

## 2022-06-17 PROCEDURE — 84153 ASSAY OF PSA TOTAL: CPT | Performed by: UROLOGY

## 2022-06-17 PROCEDURE — 36415 COLL VENOUS BLD VENIPUNCTURE: CPT | Performed by: UROLOGY

## 2022-06-17 ASSESSMENT — PAIN SCALES - GENERAL: PAINLEVEL: NO PAIN (0)

## 2022-06-17 NOTE — LETTER
"6/17/2022       RE: Francoise Valerio  [address]  [facility] [address]     Dear Colleague,    Thank you for referring your patient, Francoise Valerio, to the [facility] UROLOGY CL[facility] at [facility]. Please see a copy of my visit note below.    [place]   CHIEF COMPLAINT   It was my pleasure to see Francoise Valerio who is a 66 year old male for follow-up of ELEVATED PSA .      HPI   Francoise Valerio is a very pleasant 66 year old male    Initial PSA: 9.31  Biopsy Cuca Score: 3 + 4 = 7  Risk Group: GG2  Status post RALP/BPLND on 11/22/2021  Age at time of surgery: 66  Pathologic Cuca Score: 3 + 4 = 7  Positive Margins: None  Extra Capsular Extension: None  SV Involvement: None  Pathologic Stage: pT2  Number of Lymph Nodes Removed: 6  Number of Positive Lymph Nodes: 0  Nerve status: Bilateral nerve sparing    3/7/22:  He is feeling really good  Urine control is making good progress, 1 ppd  At times will have some drips with walking  He did not schedule the physical therapy  Erections are working well and normally     TODAY 6/17/2022:  He is doing well  \"I feel normal\"  He has stopped using pads at home, if he goes out he uses one for security  Erections are working well  He notes that if he drinks a lot of water he will then have significant urgency, he does void well and that these voids are large volume    PHYSICAL EXAM  Patient is a 66 year old  male   Vitals: Blood pressure 138/74, pulse 84, height 1.727 m (5' 8\"), weight 68.9 kg (152 lb), SpO2 97 %.  Body mass index is 23.11 kg/m .  General Appearance Adult:   Alert, no acute distress, oriented  HENT: throat/mouth:normal, good dentition  Lungs: no respiratory distress, or pursed lip breathing  Heart: No obvious jugular venous distension present  Abdomen: soft, nontender, no organomegaly or masses  Incisions: well healed, no evidence of hernia  Musculoskeltal: extremities normal, no peripheral edema  Skin: no " suspicious lesions or rashes  Neuro: Alert, oriented, speech and mentation normal  Psych: affect and mood normal  Gait: Normal    Component PSA   Latest Ref Rng & Units 0.00 - 4.00 ug/L   2/2/2017 5.94 (H)   2/19/2018 5.39 (H)   3/14/2019 6.36 (H)   2/6/2020 7.06 (H)   5/7/2021 9.31 (H)   3/7/2022 <0.04   6/17/2022 <0.04     Final Diagnosis   A(A). Prostate, LEFT LATERAL BASE, Prostate needle biopsy:  - Benign prostatic tissue. Negative for malignancy.     B(B). Prostate, LEFT LATERAL MID, Prostate needle biopsy:  - Benign prostatic tissue. Negative for malignancy.     C(C). Prostate, LEFT LATERAL APEX, Prostate needle biopsy:  - Benign prostatic tissue. Negative for malignancy.     D(D). Prostate, LEFT BASE, Prostate needle biopsy:  - Benign prostatic tissue. Negative for malignancy.     E(E). Prostate, LEFT MID, Prostate needle biopsy:  - Benign prostatic tissue. Negative for malignancy.     F(F). Prostate, LEFT APEX, Prostate needle biopsy:  -High-grade prostatic intraepithelial neoplasia.  Negative for invasive malignancy     G(G). Prostate, RIGHT LATERAL BASE, Prostate needle biopsy:  - Benign prostatic tissue. Negative for malignancy.     H(H). Prostate, RIGHT LATERAL MID, Prostate needle biopsy:  - Benign prostatic tissue. Negative for malignancy.     I(I). Prostate, RIGHT LATERAL APEX, Prostate needle biopsy:  - - Prostatic adenocarcinoma, acinar type, Bishop's grade 3+4=7, grade group is 2, number of cores involved is 1 of 1, % of grade 4 is <10 %,total surface area involved is 10-15 %,perineural invasion is not identified     J(J). Prostate, RIGHT BASE, Prostate needle biopsy:  -Benign prostatic tissue.  Negative for malignancy.     K(K). Prostate, RIGHT MID, Prostate needle biopsy:  -Focal high-grade prostatic intraepithelial neoplasia.  Negative for invasive malignancy.     L(L). Prostate, RIGHT APEX, Prostate needle biopsy:  - Prostatic adenocarcinoma, acinar type, Cuca's grade 3+4=7, grade group is  2, number of cores involved is 1 of 1, % of grade 4 is 40 %,total surface area involved is 80 %,perineural invasion is  identified     M(M). Prostate, RIGHT LESION (X4), Prostate needle biopsy:  - Prostatic adenocarcinoma, acinar type, Winston's grade 3+4=7, grade group is 2, number of cores involved is 3 of 4, % of grade 4 is 30 %,total surface area involved is 30%,perineural invasion is  identified     RALP:  Final Diagnosis   A: Prostate, radical prostatectomy:  - Adenocarcinoma, acinar type, grade group 2 (Winston score 3 + 4 = 7 of 10).  - Proportion of Cuca pattern 4: 40%.  - Inked surgical margins negative for tumor (nearest inked margin: right apex, 0.1 mm).  - Positive for perineural invasion.  - Negative for extraprostatic extension.  - Negative for lymphovascular invasion.  - Negative for urinary bladder neck invasion.  - Negative for seminal vesicle invasion.     B: Lymph nodes, bilateral pelvic, biopsy:  - Negative for malignancy (6 lymph nodes).    pT2N0       IMAGING:  All pertinent imaging reviewed:    All imaging studies reviewed by me.  I personally reviewed these imaging films.  A formal report from radiology will follow.    MRI PROSTATE 7/23/21:  FINDINGS:  Size:  35.7 grams  Hemorrhage: Absent  Peripheral zone: Heterogeneous on T2-weighted images. Suspicious  lesions as detailed below.  Transition zone: Enlarged with BPH changes. Transition zone nodules  which are circumscribed or mostly encapsulated without diffusion  restriction.  PI-RADS 2.  No highly suspicious nodules.     Lesion(s) in rank order of severity (highest score- to lowest score,  then by size)      Lesion 1:  Location: Right apex peripheral zone at the 10-12 o'clock position  relative to the urethra. Series 14 image 21.  Series 7, image 65  Size: 14 mm  T2 description: Not circumscribed, moderate hypointensity  T2 numerical assessment: 3  DWI description: Focal markedly hypointense on ADC and markedly  hyperintense on high  B-value DWI.  DWI numerical assessment: 4  DCE assessment: Positive    Prostate margin: Capsular abutment 6-15 mm  Lesion overall PI-RADS category: 4     Neurovascular bundles: No suspicion of involvement by malignancy  Seminal vesicles: Not involved by tumor.  Lymph nodes: No significant adenopathy. Subcentimeter bilateral  external iliac lymph nodes (series 19, image 26), measuring 4 mm on  the right and 3 mm on the left. Subcentimeter bilateral inguinal lymph  nodes likely reactive  Bones: No suspicious lesions.  Other pelvic organs: No additional findings. Colonic diverticulosis..  Small fat-containing left inguinal hernia.                                            IMPRESSION:  1. Based on the most suspicious abnormality, this exam is  characterized as PIRADS 4 - Clinically significant cancer is likely to  be present.?The most suspicious abnormality is located at the right  apex peripheral zone, the 10:00 to 12:00 position and there is short  segment capsular abutment with low likelihood of minimal  extraprostatic extension.  2. No suspicious adenopathy or evidence of pelvic metastases.    ASSESSMENT and PLAN  66-year-old man with elevated PSA at 9.31 and now with an MRI with a concerning PI-RADS 4 lesion with low likelihood of minimal extraprostatic extension there is no evidence of adenopathy.  Now status post a robotic prostatectomy on 11/22/2021 with [place] 3+4 = 7 prostate cancer, pT2N0, negative margins.    [facility] Post-prostatectomy nomogram (3/7/22):  Probability of remaining recurrence-free after surgery:  2 year = 97%  5 year = 91%  7 year = 88%  10 year = 83%    15 year prostate cancer-specific survival = 99%    Prostate cancer   -Reviewed his PSA which is undetectable  -I reviewed his prior pathology results  -Follow-up in 6 months for PSA  -We discussed that his urinary urgency sounds physiologic and is not very bothersome I would not recommend any treatment at this time    Time spent: 20 minutes  spent on the date of the encounter doing chart review, history and exam, documentation and further activities as noted above.    Avila Whaley MD   Urology  [facility]  [facility] Phone: [phone]  [facility] Phone: [phone]

## 2022-06-17 NOTE — PROGRESS NOTES
"[facility]   CHIEF COMPLAINT   It was my pleasure to see Francoise Valerio who is a 66 year old male for follow-up of ELEVATED PSA .      HPI   Francoise Valerio is a very pleasant 66 year old male    Initial PSA: 9.31  Biopsy [place] Score: 3 + 4 = 7  Risk Group: GG2  Status post RALP/BPLND on 11/22/2021  Age at time of surgery: 66  Pathologic Cuca Score: 3 + 4 = 7  Positive Margins: None  Extra Capsular Extension: None  SV Involvement: None  Pathologic Stage: pT2  Number of Lymph Nodes Removed: 6  Number of Positive Lymph Nodes: 0  Nerve status: Bilateral nerve sparing    3/7/22:  He is feeling really good  Urine control is making good progress, 1 ppd  At times will have some drips with walking  He did not schedule the physical therapy  Erections are working well and normally     TODAY 6/17/2022:  He is doing well  \"I feel normal\"  He has stopped using pads at home, if he goes out he uses one for security  Erections are working well  He notes that if he drinks a lot of water he will then have significant urgency, he does void well and that these voids are large volume    PHYSICAL EXAM  Patient is a 66 year old  male   Vitals: Blood pressure 138/74, pulse 84, height 1.727 m (5' 8\"), weight 68.9 kg (152 lb), SpO2 97 %.  Body mass index is 23.11 kg/m .  General Appearance Adult:   Alert, no acute distress, oriented  HENT: throat/mouth:normal, good dentition  Lungs: no respiratory distress, or pursed lip breathing  Heart: No obvious jugular venous distension present  Abdomen: soft, nontender, no organomegaly or masses  Incisions: well healed, no evidence of hernia  Musculoskeltal: extremities normal, no peripheral edema  Skin: no suspicious lesions or rashes  Neuro: Alert, oriented, speech and mentation normal  Psych: affect and mood normal  Gait: Normal    Component PSA   Latest Ref Rng & Units 0.00 - 4.00 ug/L   2/2/2017 5.94 (H)   2/19/2018 5.39 (H)   3/14/2019 6.36 (H)   2/6/2020 7.06 (H)   5/7/2021 9.31 (H)   3/7/2022 " <0.04   6/17/2022 <0.04     Final Diagnosis   A(A). Prostate, LEFT LATERAL BASE, Prostate needle biopsy:  - Benign prostatic tissue. Negative for malignancy.     B(B). Prostate, LEFT LATERAL MID, Prostate needle biopsy:  - Benign prostatic tissue. Negative for malignancy.     C(C). Prostate, LEFT LATERAL APEX, Prostate needle biopsy:  - Benign prostatic tissue. Negative for malignancy.     D(D). Prostate, LEFT BASE, Prostate needle biopsy:  - Benign prostatic tissue. Negative for malignancy.     E(E). Prostate, LEFT MID, Prostate needle biopsy:  - Benign prostatic tissue. Negative for malignancy.     F(F). Prostate, LEFT APEX, Prostate needle biopsy:  -High-grade prostatic intraepithelial neoplasia.  Negative for invasive malignancy     G(G). Prostate, RIGHT LATERAL BASE, Prostate needle biopsy:  - Benign prostatic tissue. Negative for malignancy.     H(H). Prostate, RIGHT LATERAL MID, Prostate needle biopsy:  - Benign prostatic tissue. Negative for malignancy.     I(I). Prostate, RIGHT LATERAL APEX, Prostate needle biopsy:  - - Prostatic adenocarcinoma, acinar type, Cuca's grade 3+4=7, grade group is 2, number of cores involved is 1 of 1, % of grade 4 is <10 %,total surface area involved is 10-15 %,perineural invasion is not identified     J(J). Prostate, RIGHT BASE, Prostate needle biopsy:  -Benign prostatic tissue.  Negative for malignancy.     K(K). Prostate, RIGHT MID, Prostate needle biopsy:  -Focal high-grade prostatic intraepithelial neoplasia.  Negative for invasive malignancy.     L(L). Prostate, RIGHT APEX, Prostate needle biopsy:  - Prostatic adenocarcinoma, acinar type, Cuca's grade 3+4=7, grade group is 2, number of cores involved is 1 of 1, % of grade 4 is 40 %,total surface area involved is 80 %,perineural invasion is  identified     M(M). Prostate, RIGHT LESION (X4), Prostate needle biopsy:  - Prostatic adenocarcinoma, acinar type, Cuca's grade 3+4=7, grade group is 2, number of cores  involved is 3 of 4, % of grade 4 is 30 %,total surface area involved is 30%,perineural invasion is  identified     RALP:  Final Diagnosis   A: Prostate, radical prostatectomy:  - Adenocarcinoma, acinar type, grade group 2 (Cuca score 3 + 4 = 7 of 10).  - Proportion of Cuca pattern 4: 40%.  - Inked surgical margins negative for tumor (nearest inked margin: right apex, 0.1 mm).  - Positive for perineural invasion.  - Negative for extraprostatic extension.  - Negative for lymphovascular invasion.  - Negative for urinary bladder neck invasion.  - Negative for seminal vesicle invasion.     B: Lymph nodes, bilateral pelvic, biopsy:  - Negative for malignancy (6 lymph nodes).    pT2N0       IMAGING:  All pertinent imaging reviewed:    All imaging studies reviewed by me.  I personally reviewed these imaging films.  A formal report from radiology will follow.    MRI PROSTATE 7/23/21:  FINDINGS:  Size:  35.7 grams  Hemorrhage: Absent  Peripheral zone: Heterogeneous on T2-weighted images. Suspicious  lesions as detailed below.  Transition zone: Enlarged with BPH changes. Transition zone nodules  which are circumscribed or mostly encapsulated without diffusion  restriction.  PI-RADS 2.  No highly suspicious nodules.     Lesion(s) in rank order of severity (highest score- to lowest score,  then by size)      Lesion 1:  Location: Right apex peripheral zone at the 10-12 o'clock position  relative to the urethra. Series 14 image 21.  Series 7, image 65  Size: 14 mm  T2 description: Not circumscribed, moderate hypointensity  T2 numerical assessment: 3  DWI description: Focal markedly hypointense on ADC and markedly  hyperintense on high B-value DWI.  DWI numerical assessment: 4  DCE assessment: Positive    Prostate margin: Capsular abutment 6-15 mm  Lesion overall PI-RADS category: 4     Neurovascular bundles: No suspicion of involvement by malignancy  Seminal vesicles: Not involved by tumor.  Lymph nodes: No significant  adenopathy. Subcentimeter bilateral  external iliac lymph nodes (series 19, image 26), measuring 4 mm on  the right and 3 mm on the left. Subcentimeter bilateral inguinal lymph  nodes likely reactive  Bones: No suspicious lesions.  Other pelvic organs: No additional findings. Colonic diverticulosis..  Small fat-containing left inguinal hernia.                                            IMPRESSION:  1. Based on the most suspicious abnormality, this exam is  characterized as PIRADS 4 - Clinically significant cancer is likely to  be present.?The most suspicious abnormality is located at the right  apex peripheral zone, the 10:00 to 12:00 position and there is short  segment capsular abutment with low likelihood of minimal  extraprostatic extension.  2. No suspicious adenopathy or evidence of pelvic metastases.    ASSESSMENT and PLAN  66-year-old man with elevated PSA at 9.31 and now with an MRI with a concerning PI-RADS 4 lesion with low likelihood of minimal extraprostatic extension there is no evidence of adenopathy.  Now status post a robotic prostatectomy on 11/22/2021 with [place] 3+4 = 7 prostate cancer, pT2N0, negative margins.    [facility] Post-prostatectomy nomogram (3/7/22):  Probability of remaining recurrence-free after surgery:  2 year = 97%  5 year = 91%  7 year = 88%  10 year = 83%    15 year prostate cancer-specific survival = 99%    Prostate cancer   -Reviewed his PSA which is undetectable  -I reviewed his prior pathology results  -Follow-up in 6 months for PSA  -We discussed that his urinary urgency sounds physiologic and is not very bothersome I would not recommend any treatment at this time    Time spent: 20 minutes spent on the date of the encounter doing chart review, history and exam, documentation and further activities as noted above.    Avila Whaley MD   Urology  [facility]  [facility] Phone: [phone]  [facility] Phone:  [phone]

## 2022-06-25 ENCOUNTER — HEALTH MAINTENANCE LETTER (OUTPATIENT)
Age: 67
End: 2022-06-25

## 2022-12-19 ENCOUNTER — OFFICE VISIT (OUTPATIENT)
Dept: UROLOGY | Facility: CLINIC | Age: 67
End: 2022-12-19
Payer: COMMERCIAL

## 2022-12-19 VITALS
SYSTOLIC BLOOD PRESSURE: 143 MMHG | WEIGHT: 150 LBS | OXYGEN SATURATION: 98 % | DIASTOLIC BLOOD PRESSURE: 91 MMHG | BODY MASS INDEX: 22.73 KG/M2 | HEART RATE: 94 BPM | HEIGHT: 68 IN

## 2022-12-19 DIAGNOSIS — C61 PROSTATE CANCER (H): Primary | ICD-10-CM

## 2022-12-19 LAB — PSA SERPL-MCNC: <0.04 UG/L (ref 0–4)

## 2022-12-19 PROCEDURE — 99213 OFFICE O/P EST LOW 20 MIN: CPT | Performed by: UROLOGY

## 2022-12-19 PROCEDURE — 84153 ASSAY OF PSA TOTAL: CPT | Performed by: UROLOGY

## 2022-12-19 PROCEDURE — 36415 COLL VENOUS BLD VENIPUNCTURE: CPT | Performed by: UROLOGY

## 2022-12-19 ASSESSMENT — PAIN SCALES - GENERAL: PAINLEVEL: NO PAIN (0)

## 2022-12-19 NOTE — LETTER
"12/19/2022       RE: Francoise Valerio  [address]  [facility] [id number]     Dear Colleague,    Thank you for referring your patient, Franciose Valerio, to the [facility] at [facility]. Please see a copy of my visit note below.    [place]   CHIEF COMPLAINT   It was my pleasure to see Francoise Valerio who is a 67 year old male for follow-up of ELEVATED PSA .      HPI   Francoise Valerio is a very pleasant 67 year old male    Initial PSA: 9.31  Biopsy [employer] Score: 3 + 4 = 7  Risk Group: GG2  Status post RALP/BPLND on 11/22/2021  Age at time of surgery: 66  Pathologic Cuca Score: 3 + 4 = 7  Positive Margins: None  Extra Capsular Extension: None  SV Involvement: None  Pathologic Stage: pT2  Number of Lymph Nodes Removed: 6  Number of Positive Lymph Nodes: 0  Nerve status: Bilateral nerve sparing    3/7/22:  He is feeling really good  Urine control is making good progress, 1 ppd  At times will have some drips with walking  He did not schedule the physical therapyx`  Erections are working well and normally     6/17/2022:  He is doing well  \"I feel normal\"  He has stopped using pads at home, if he goes out he uses one for security  Erections are working well  He notes that if he drinks a lot of water he will then have significant urgency, he does void well and that these voids are large volume    TODAY 12/19/2022:  He is doing extremely well and feels back to normal  No pads needed    PHYSICAL EXAM  Patient is a 67 year old  male   Vitals: Blood pressure (!) 143/91, pulse 94, height 1.727 m (5' 8\"), weight 68 kg (150 lb), SpO2 98 %.  Body mass index is 22.81 kg/m .  General Appearance Adult:   Alert, no acute distress, oriented  HENT: throat/mouth:normal, good dentition  Lungs: no respiratory distress, or pursed lip breathing  Heart: No obvious jugular venous distension present  Abdomen: soft, nontender, no organomegaly or masses  Incisions: well healed, no " evidence of hernia  Musculoskeltal: extremities normal, no peripheral edema  Skin: no suspicious lesions or rashes  Neuro: Alert, oriented, speech and mentation normal  Psych: affect and mood normal  Gait: Normal    Component PSA   Latest Ref Rng & Units 0.00 - 4.00 ug/L   2/2/2017 5.94 (H)   2/19/2018 5.39 (H)   3/14/2019 6.36 (H)   2/6/2020 7.06 (H)   5/7/2021 9.31 (H)   3/7/2022 <0.04   6/17/2022 <0.04   12/19/2022 <0.04     Final Diagnosis   A(A). Prostate, LEFT LATERAL BASE, Prostate needle biopsy:  - Benign prostatic tissue. Negative for malignancy.     B(B). Prostate, LEFT LATERAL MID, Prostate needle biopsy:  - Benign prostatic tissue. Negative for malignancy.     C(C). Prostate, LEFT LATERAL APEX, Prostate needle biopsy:  - Benign prostatic tissue. Negative for malignancy.     D(D). Prostate, LEFT BASE, Prostate needle biopsy:  - Benign prostatic tissue. Negative for malignancy.     E(E). Prostate, LEFT MID, Prostate needle biopsy:  - Benign prostatic tissue. Negative for malignancy.     F(F). Prostate, LEFT APEX, Prostate needle biopsy:  -High-grade prostatic intraepithelial neoplasia.  Negative for invasive malignancy     G(G). Prostate, RIGHT LATERAL BASE, Prostate needle biopsy:  - Benign prostatic tissue. Negative for malignancy.     H(H). Prostate, RIGHT LATERAL MID, Prostate needle biopsy:  - Benign prostatic tissue. Negative for malignancy.     I(I). Prostate, RIGHT LATERAL APEX, Prostate needle biopsy:  - - Prostatic adenocarcinoma, acinar type, Cuca's grade 3+4=7, grade group is 2, number of cores involved is 1 of 1, % of grade 4 is <10 %,total surface area involved is 10-15 %,perineural invasion is not identified     J(J). Prostate, RIGHT BASE, Prostate needle biopsy:  -Benign prostatic tissue.  Negative for malignancy.     K(K). Prostate, RIGHT MID, Prostate needle biopsy:  -Focal high-grade prostatic intraepithelial neoplasia.  Negative for invasive malignancy.     L(L). Prostate, RIGHT  APEX, Prostate needle biopsy:  - Prostatic adenocarcinoma, acinar type, [place]'s grade 3+4=7, grade group is 2, number of cores involved is 1 of 1, % of grade 4 is 40 %,total surface area involved is 80 %,perineural invasion is  identified     M(M). Prostate, RIGHT LESION (X4), Prostate needle biopsy:  - Prostatic adenocarcinoma, acinar type, Cuca's grade 3+4=7, grade group is 2, number of cores involved is 3 of 4, % of grade 4 is 30 %,total surface area involved is 30%,perineural invasion is  identified     RALP:  Final Diagnosis   A: Prostate, radical prostatectomy:  - Adenocarcinoma, acinar type, grade group 2 (Cuca score 3 + 4 = 7 of 10).  - Proportion of Cuca pattern 4: 40%.  - Inked surgical margins negative for tumor (nearest inked margin: right apex, 0.1 mm).  - Positive for perineural invasion.  - Negative for extraprostatic extension.  - Negative for lymphovascular invasion.  - Negative for urinary bladder neck invasion.  - Negative for seminal vesicle invasion.     B: Lymph nodes, bilateral pelvic, biopsy:  - Negative for malignancy (6 lymph nodes).    pT2N0       IMAGING:  All pertinent imaging reviewed:    All imaging studies reviewed by me.  I personally reviewed these imaging films.  A formal report from radiology will follow.    MRI PROSTATE 7/23/21:  FINDINGS:  Size:  35.7 grams  Hemorrhage: Absent  Peripheral zone: Heterogeneous on T2-weighted images. Suspicious  lesions as detailed below.  Transition zone: Enlarged with BPH changes. Transition zone nodules  which are circumscribed or mostly encapsulated without diffusion  restriction.  PI-RADS 2.  No highly suspicious nodules.     Lesion(s) in rank order of severity (highest score- to lowest score,  then by size)      Lesion 1:  Location: Right apex peripheral zone at the 10-12 o'clock position  relative to the urethra. Series 14 image 21.  Series 7, image 65  Size: 14 mm  T2 description: Not circumscribed, moderate hypointensity  T2  numerical assessment: 3  DWI description: Focal markedly hypointense on ADC and markedly  hyperintense on high B-value DWI.  DWI numerical assessment: 4  DCE assessment: Positive    Prostate margin: Capsular abutment 6-15 mm  Lesion overall PI-RADS category: 4     Neurovascular bundles: No suspicion of involvement by malignancy  Seminal vesicles: Not involved by tumor.  Lymph nodes: No significant adenopathy. Subcentimeter bilateral  external iliac lymph nodes (series 19, image 26), measuring 4 mm on  the right and 3 mm on the left. Subcentimeter bilateral inguinal lymph  nodes likely reactive  Bones: No suspicious lesions.  Other pelvic organs: No additional findings. Colonic diverticulosis..  Small fat-containing left inguinal hernia.                                            IMPRESSION:  1. Based on the most suspicious abnormality, this exam is  characterized as PIRADS 4 - Clinically significant cancer is likely to  be present.?The most suspicious abnormality is located at the right  apex peripheral zone, the 10:00 to 12:00 position and there is short  segment capsular abutment with low likelihood of minimal  extraprostatic extension.  2. No suspicious adenopathy or evidence of pelvic metastases.    ASSESSMENT and PLAN  67-year-old man with elevated PSA at 9.31 and now with an MRI with a concerning PI-RADS 4 lesion with low likelihood of minimal extraprostatic extension there is no evidence of adenopathy.  Now status post a robotic prostatectomy on 11/22/2021 with Cuca 3+4 = 7 prostate cancer, pT2N0, negative margins.    [facility] Post-prostatectomy nomogram (12/19/2022):  Probability of remaining recurrence-free after surgery:  2 year = 98%  5 year = 92%  7 year = 88%  10 year = 83%    15 year prostate cancer-specific survival = 99%    Prostate cancer   -Reviewed his PSA which is undetectable  -I reviewed his prior pathology results  - Reviewed his [facility] post prostatectomy nomogram results and I updated these  today  -Plan for PSA only in 6 months and I will send him this result on [employer]  - Follow-up with me in 1 year with another PSA    Time spent: 10 minutes spent on the date of the encounter doing chart review, history and exam, documentation and further activities as noted above.    Avila Whaley MD   Urology  [facility]  [facility] Phone: [phone]  [facility] Phone: [phone]

## 2022-12-19 NOTE — NURSING NOTE
Chief Complaint   Patient presents with     Prostate Cancer     Follow Up     Patient in clinic today for psa same day results   no leakage .   Poncho Evans, CMA

## 2022-12-19 NOTE — PROGRESS NOTES
"CASS   CHIEF COMPLAINT   It was my pleasure to see Francoise Valerio who is a 67 year old male for follow-up of ELEVATED PSA .      HPI   Francoise Valerio is a very pleasant 67 year old male    Initial PSA: 9.31  Biopsy Sanford Score: 3 + 4 = 7  Risk Group: GG2  Status post RALP/BPLND on 11/22/2021  Age at time of surgery: 66  Pathologic Cuca Score: 3 + 4 = 7  Positive Margins: None  Extra Capsular Extension: None  SV Involvement: None  Pathologic Stage: pT2  Number of Lymph Nodes Removed: 6  Number of Positive Lymph Nodes: 0  Nerve status: Bilateral nerve sparing    3/7/22:  He is feeling really good  Urine control is making good progress, 1 ppd  At times will have some drips with walking  He did not schedule the physical therapyx`  Erections are working well and normally     6/17/2022:  He is doing well  \"I feel normal\"  He has stopped using pads at home, if he goes out he uses one for security  Erections are working well  He notes that if he drinks a lot of water he will then have significant urgency, he does void well and that these voids are large volume    TODAY 12/19/2022:  He is doing extremely well and feels back to normal  No pads needed    PHYSICAL EXAM  Patient is a 67 year old  male   Vitals: Blood pressure (!) 143/91, pulse 94, height 1.727 m (5' 8\"), weight 68 kg (150 lb), SpO2 98 %.  Body mass index is 22.81 kg/m .  General Appearance Adult:   Alert, no acute distress, oriented  HENT: throat/mouth:normal, good dentition  Lungs: no respiratory distress, or pursed lip breathing  Heart: No obvious jugular venous distension present  Abdomen: soft, nontender, no organomegaly or masses  Incisions: well healed, no evidence of hernia  Musculoskeltal: extremities normal, no peripheral edema  Skin: no suspicious lesions or rashes  Neuro: Alert, oriented, speech and mentation normal  Psych: affect and mood normal  Gait: Normal    Component PSA   Latest Ref Rng & Units 0.00 - 4.00 ug/L   2/2/2017 5.94 (H) "   2/19/2018 5.39 (H)   3/14/2019 6.36 (H)   2/6/2020 7.06 (H)   5/7/2021 9.31 (H)   3/7/2022 <0.04   6/17/2022 <0.04   12/19/2022 <0.04     Final Diagnosis   A(A). Prostate, LEFT LATERAL BASE, Prostate needle biopsy:  - Benign prostatic tissue. Negative for malignancy.     B(B). Prostate, LEFT LATERAL MID, Prostate needle biopsy:  - Benign prostatic tissue. Negative for malignancy.     C(C). Prostate, LEFT LATERAL APEX, Prostate needle biopsy:  - Benign prostatic tissue. Negative for malignancy.     D(D). Prostate, LEFT BASE, Prostate needle biopsy:  - Benign prostatic tissue. Negative for malignancy.     E(E). Prostate, LEFT MID, Prostate needle biopsy:  - Benign prostatic tissue. Negative for malignancy.     F(F). Prostate, LEFT APEX, Prostate needle biopsy:  -High-grade prostatic intraepithelial neoplasia.  Negative for invasive malignancy     G(G). Prostate, RIGHT LATERAL BASE, Prostate needle biopsy:  - Benign prostatic tissue. Negative for malignancy.     H(H). Prostate, RIGHT LATERAL MID, Prostate needle biopsy:  - Benign prostatic tissue. Negative for malignancy.     I(I). Prostate, RIGHT LATERAL APEX, Prostate needle biopsy:  - - Prostatic adenocarcinoma, acinar type, Pennington's grade 3+4=7, grade group is 2, number of cores involved is 1 of 1, % of grade 4 is <10 %,total surface area involved is 10-15 %,perineural invasion is not identified     J(J). Prostate, RIGHT BASE, Prostate needle biopsy:  -Benign prostatic tissue.  Negative for malignancy.     K(K). Prostate, RIGHT MID, Prostate needle biopsy:  -Focal high-grade prostatic intraepithelial neoplasia.  Negative for invasive malignancy.     L(L). Prostate, RIGHT APEX, Prostate needle biopsy:  - Prostatic adenocarcinoma, acinar type, Cuca's grade 3+4=7, grade group is 2, number of cores involved is 1 of 1, % of grade 4 is 40 %,total surface area involved is 80 %,perineural invasion is  identified     M(M). Prostate, RIGHT LESION (X4), Prostate needle  biopsy:  - Prostatic adenocarcinoma, acinar type, Cuca's grade 3+4=7, grade group is 2, number of cores involved is 3 of 4, % of grade 4 is 30 %,total surface area involved is 30%,perineural invasion is  identified     RALP:  Final Diagnosis   A: Prostate, radical prostatectomy:  - Adenocarcinoma, acinar type, grade group 2 (Cuca score 3 + 4 = 7 of 10).  - Proportion of [place] pattern 4: 40%.  - Inked surgical margins negative for tumor (nearest inked margin: right apex, 0.1 mm).  - Positive for perineural invasion.  - Negative for extraprostatic extension.  - Negative for lymphovascular invasion.  - Negative for urinary bladder neck invasion.  - Negative for seminal vesicle invasion.     B: Lymph nodes, bilateral pelvic, biopsy:  - Negative for malignancy (6 lymph nodes).    pT2N0       IMAGING:  All pertinent imaging reviewed:    All imaging studies reviewed by me.  I personally reviewed these imaging films.  A formal report from radiology will follow.    MRI PROSTATE 7/23/21:  FINDINGS:  Size:  35.7 grams  Hemorrhage: Absent  Peripheral zone: Heterogeneous on T2-weighted images. Suspicious  lesions as detailed below.  Transition zone: Enlarged with BPH changes. Transition zone nodules  which are circumscribed or mostly encapsulated without diffusion  restriction.  PI-RADS 2.  No highly suspicious nodules.     Lesion(s) in rank order of severity (highest score- to lowest score,  then by size)      Lesion 1:  Location: Right apex peripheral zone at the 10-12 o'clock position  relative to the urethra. Series 14 image 21.  Series 7, image 65  Size: 14 mm  T2 description: Not circumscribed, moderate hypointensity  T2 numerical assessment: 3  DWI description: Focal markedly hypointense on ADC and markedly  hyperintense on high B-value DWI.  DWI numerical assessment: 4  DCE assessment: Positive    Prostate margin: Capsular abutment 6-15 mm  Lesion overall PI-RADS category: 4     Neurovascular bundles: No  suspicion of involvement by malignancy  Seminal vesicles: Not involved by tumor.  Lymph nodes: No significant adenopathy. Subcentimeter bilateral  external iliac lymph nodes (series 19, image 26), measuring 4 mm on  the right and 3 mm on the left. Subcentimeter bilateral inguinal lymph  nodes likely reactive  Bones: No suspicious lesions.  Other pelvic organs: No additional findings. Colonic diverticulosis..  Small fat-containing left inguinal hernia.                                            IMPRESSION:  1. Based on the most suspicious abnormality, this exam is  characterized as PIRADS 4 - Clinically significant cancer is likely to  be present.?The most suspicious abnormality is located at the right  apex peripheral zone, the 10:00 to 12:00 position and there is short  segment capsular abutment with low likelihood of minimal  extraprostatic extension.  2. No suspicious adenopathy or evidence of pelvic metastases.    ASSESSMENT and PLAN  67-year-old man with elevated PSA at 9.31 and now with an MRI with a concerning PI-RADS 4 lesion with low likelihood of minimal extraprostatic extension there is no evidence of adenopathy.  Now status post a robotic prostatectomy on 11/22/2021 with [place] 3+4 = 7 prostate cancer, pT2N0, negative margins.    [facility] Post-prostatectomy nomogram (12/19/2022):  Probability of remaining recurrence-free after surgery:  2 year = 98%  5 year = 92%  7 year = 88%  10 year = 83%    15 year prostate cancer-specific survival = 99%    Prostate cancer   -Reviewed his PSA which is undetectable  -I reviewed his prior pathology results  - Reviewed his [facility] post prostatectomy nomogram results and I updated these today  -Plan for PSA only in 6 months and I will send him this result [place]  - Follow-up with me in 1 year with another PSA    Time spent: 10 minutes spent on the date of the encounter doing chart review, history and exam, documentation and further activities as noted above.    Avila MATHIS  MD Jovana   Urology  [facility]  [facility] Phone: [phone]  [facility] Phone: [phone]

## 2023-01-24 NOTE — PROGRESS NOTES
Outpatient Sleep Medicine Consultation:      Name: Francoise Valerio MRN# [id number]   Age: 67 year old YOB: 1955     Date of Consultation: January 25, 2023  Consultation is requested by: No referring provider defined for this encounter. No ref. provider found  Primary care provider: Tesfaye Kelley       Reason for Sleep Consult:     Francoise Valerio is sent by No ref. provider found for a sleep consultation regarding snoring.    Patient s Reason for visit  Francoise Valerio main reason for visit:  Snoring and concerns for apnea  Patient states problem(s) started:  5+years  Francoise Valerio's goals for this visit:  Have evaluation           Assessment and Plan:     Summary Sleep Diagnoses and Recommendations:  (R06.83) Snoring  (primary encounter diagnosis), (G47.30) Observed sleep apnea, (R40.0) Sleepiness  Comment: Mr. Valerio presents with concerns of sleep apnea. His wife observes loud snoring and pauses in his breathing over the last 3-5 years. He does not think the pauses in breathing happen all of the time, possibly related to sleeping on his back. He also was setting off the oximeter alarm after surgery a year ago. His sister has ROMEL. He has some daytime sleepiness, taking cat naps when he gets tired working from home. His ESS was just in the normal range at 8/24. STOP BANG TOTAL: 5 snoring, tired, observed apnea, age >50 (67), male gender. Negative risk factors: no HTN, BMI 22. We do not have a neck measurement.  Plan: HST-Home Sleep Apnea Test - Noxturnal Returnable              Comorbid Diagnoses:  Prostate cancer    Summary Counseling:    Sleep Testing Reviewed  Obstructive Sleep Apnea Reviewed  Complications of Untreated Sleep Apnea Reviewed      Patient will follow up 2 weeks after sleep study.  Bennett Goltz, PA-C      Total time spent reviewing medical records, history and physical examination, review of previous testing and interpretation as well as documentation on this date: 61 min    CC: Tesfaye Kelley MD           History of Present Illness:     Mr. Valerio presents because his snoring has been bothering his wife. She sometimes notices pauses in his breathing. He does not think it happens every night, but is not sure what may contribute to that. He does think it may vary with weight.  He was in the hospital after a prostate surgery a year ago and he was setting off the oximeter alarm. He also notes waking himself with snorts.     Past Sleep Evaluations: none    SLEEP-WAKE SCHEDULE:     Work/School Days: Patient goes to school/work:     Usually gets into bed at  9 PM  Takes patient about 5-10 min to fall asleep  Has trouble falling asleep 0  nights per week  Wakes up in the middle of the night 1  times.  Wakes up due to  Restroom, let the dog out  He has trouble falling back asleep 0  times a week.   It usually takes 5 min  to get back to sleep  Patient is usually up at  4-5 AM  Uses alarm:  no    Weekends/Non-work Days/All Other Days:  His sleep is the same on weekends    Sleep Need  Patient gets  7+  sleep on average   Patient thinks he needs about 6 hours sleep    Francoise Valerio prefers to sleep in this position(s):   Back, sides  Patient states they do the following activities in bed:  No TV, electronics or reading in bed    Naps  Patient takes a purposeful nap variable  times a week and naps are usually 5 min  in duration. He may get tired when work and go sit on the couch.   He feels better after a nap:  yes  He dozes off unintentionally frequently after dinner  Patient has had a driving accident or near-miss due to sleepiness/drowsiness:  no      SLEEP DISRUPTIONS:    Breathing/Snoring  Patient snores: yes, louder when on his back.  Other people complain about his snoring:  yes  Patient has been told he stops breathing in his sleep:  yes  He has issues with the following:  Occasional morning headaches. He has dry mouth in the morning. No difficulty breathing through nose. No nocturnal heartburn or reflux    Movement:  Patient  gets pain, discomfort, with an urge to move:   No restless legs syndrome.  It happens when he is resting:     It happens more at night:     Patient has been told he kicks his legs at night:   No periodic kicking.     Behaviors in Sleep:  Francoise Valerio has experienced the following behaviors while sleeping:  His wife and dentist have mentioned it and he has been advised to use a mouthguard. He had one a long time ago, but not currently. He is in the process of getting dental implants.   Pt denies sleep talking, sleep walking, and dream enactment behavior. Pt denies sleep paralysis, hypnagogue and cataplexy.       Is there anything else you would like your sleep provider to know:          CAFFEINE AND OTHER SUBSTANCES:    Patient consumes caffeinated beverages per day:   1 mug coffee  Last caffeine use is usually:  noon  List of any prescribed or over the counter stimulants that patient takes:  none  List of any prescribed or over the counter sleep medication patient takes:  none  List of previous sleep medications that patient has tried:  none  Patient drinks alcohol to help them sleep:  no  Patient drinks alcohol near bedtime:  no    Family History:  Patient has a family member been diagnosed with a sleep disorder:    His sister has ROMEL. Father snored loudly.      Social History:  He works as a  for [employer]  He lives with his wife.       SCALES:    EPWORTH SLEEPINESS SCALE      [place] Sleepiness Scale ( MARTA Grimaldo  7590-4436<br>ESS - [place]/English - Final version - 21 Nov 07 - [facility] [place].) 1/25/2023   Sitting and reading Moderate chance of dozing   Watching TV Moderate chance of dozing   Sitting, inactive in a public place (e.g. a theatre or a meeting) Would never doze   As a passenger in a car for an hour without a break Would never doze   Lying down to rest in the afternoon when circumstances permit Moderate chance of dozing   Sitting and talking to someone Would never doze   Sitting quietly  after a lunch without alcohol Moderate chance of dozing   In a car, while stopped for a few minutes in traffic Would never doze   [place] Score (MC) 8   [place] Score (Sleep) 8         INSOMNIA SEVERITY INDEX (JOSEPH)      Insomnia Severity Index (JOSEPH) 1/25/2023   Difficulty falling asleep 0   Difficulty staying asleep 0   Problems waking up too early 0   How SATISFIED/DISSATISFIED are you with your CURRENT sleep pattern? 1   How NOTICEABLE to others do you think your sleep problem is in terms of impairing the quality of your life? 2   How WORRIED/DISTRESSED are you about your current sleep problem? 2   To what extent do you consider your sleep problem to INTERFERE with your daily functioning (e.g. daytime fatigue, mood, ability to function at work/daily chores, concentration, memory, mood, etc.) CURRENTLY? 2   JOSEPH Total Score 7       Guidelines for Scoring/Interpretation:  Total score categories:  0-7 = No clinically significant insomnia   8-14 = Subthreshold insomnia   15-21 = Clinical insomnia (moderate severity)  22-28 = Clinical insomnia (severe)  Used via courtesy of www.Vtapth.va.gov with permission from Jed Brannon PhD., [facility]      STOP BANG     STOP BANG Questionnaire (  2008, the American Society of Anesthesiologists, Inc. [employer] 1/25/2023   1. Snoring - Do you snore loudly (louder than talking or loud enough to be heard through closed doors)? Yes   2. Tired - Do you often feel tired, fatigued, or sleepy during daytime? No   3. Observed - Has anyone observed you stop breathing during your sleep? Yes   4. Blood pressure - Do you have or are you being treated for high blood pressure? No   5. BMI - BMI more than 35 kg/m2? Yes   6. Age - Age over 50 yr old? Yes   7. Neck circumference - Neck circumference greater than 40 cm? No   8. Gender - Gender male? Yes   STOP BANG Score (MC): 4 (High risk of ROMEL)   B/P Clinic: -   BMI Clinic: 22.05         GAD7    No flowsheet  "data found.      CAGE-AID    No flowsheet data found.    CAGE-AID reprinted with permission from the [place] Medical Journal, DEREK Kapoor. and CAROLYN Urbina, \"Conjoint screening questionnaires for alcohol and drug abuse\" [place] Medical Journal 94: 135-140, 1995.      PATIENT HEALTH QUESTIONNAIRE-9 (PHQ - 9)    No flowsheet data found.    Developed by Brent Orr, Brunilda Samano, Devonte Tejada and colleagues, with an educational valerie from [employer] No permission required to reproduce, translate, display or distribute.        Allergies:    Allergies   Allergen Reactions     Penicillins        Medications:    No current outpatient medications on file.       Problem List:  Patient Active Problem List    Diagnosis Date Noted     Prediabetes 11/21/2012     Priority: High     Prostate cancer (H) 11/22/2021     Priority: Medium     Elevated prostate specific antigen (PSA) 05/07/2021     Priority: Medium     Vitamin D deficiency 11/20/2012     Priority: Medium     Problem list name updated by automated process. 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 On Imuran (Dr. Moran) 10 yrs.        Advanced directives, counseling/discussion 10/24/2011     Priority: Low     Advance Care Planning 2/2/2017: ACP Review of Chart / Resources Provided:  Reviewed chart for advance care plan.  Francoise Valerio has been provided information and resources to begin or update their advance care plan.  Added by Zenia Farfan        Advance Directive Problem List Overview:   Name Relationship Phone    Primary Health Care Agent            Alternative Health Care Agent      10/24/11 Mailed pt informational letter regarding the [employer] Program for the development of an Advanced Care Directive. Jasmin Andrews RN        CARDIOVASCULAR SCREENING; LDL GOAL LESS THAN 160 10/31/2010     Priority: Low        Past " "Medical/Surgical History:  Past Medical History:   Diagnosis Date     Anemia, unspecified      Extrinsic asthma, unspecified      Other disorders of bone and cartilage(733.99)     polychondritis on the ear     Other specified disorders of pancreatic internal secretion      Past Surgical History:   Procedure Laterality Date     DAVINCI PROSTATECTOMY Bilateral 11/22/2021    Procedure: ROBOTIC ASSISTED LAPAROSCOPIC RADICAL PROSTATECTOMY WITH BILATERAL PELVIC LYMPH NODE DISSECTION;  Surgeon: Avila Whaley MD;  Location: [facility] OR     ENT SURGERY      surgery on cartilage on ears and nose     PROSTATE SURGERY         Social History:  Social History     Socioeconomic History     Marital status:      Spouse name: Not on file     Number of children: Not on file     Years of education: Not on file     Highest education level: Not on file   Occupational History     Not on file   Tobacco Use     Smoking status: Never     Smokeless tobacco: Never   Substance and Sexual Activity     Alcohol use: Yes     Comment: red wine     Drug use: No     Sexual activity: Yes     Partners: Female   Other Topics Concern     Parent/sibling w/ CABG, MI or angioplasty before 65F 55M? Not Asked   Social History Narrative     Not on file     Social Determinants of Health     Financial Resource Strain: Not on file   Food Insecurity: Not on file   Transportation Needs: Not on file   Physical Activity: Not on file   Stress: Not on file   Social Connections: Not on file   Intimate Partner Violence: Not on file   Housing Stability: Not on file       Family History:  Family History   Problem Relation Age of Onset     Heart Disease Mother         tachycardia/palpitations       Review of Systems:  A complete review of systems reviewed by me is negative with the exeption of what has been mentioned in the history of present illness.        Physical Examination:  Vitals: Ht 1.727 m (5' 8\")   Wt 65.8 kg (145 lb)   BMI 22.05 kg/m    BMI= Body mass index " is 22.05 kg/m .           GENERAL APPEARANCE: healthy, alert, no distress and cooperative  EYES: Eyes grossly normal to inspection and wearing glasses  HENT: tongue base enlarged, oral mucous membranes moist and oropharynx clear   NECK: no asymmetry, masses, or scars  RESP: no respiratory distress, cough or wheeze  Mallampati Class: IV.  Tonsillar Stage: 0  surgically removed.         Data: All pertinent previous laboratory data reviewed     Recent Labs   Lab Test 11/23/21  0712 11/23/21  0615 05/07/21  0851     --  138   POTASSIUM 3.7  --  4.1   CHLORIDE 103  --  105   CO2 27  --  30   ANIONGAP 4  --  3   * 140* 119*   BUN 15  --  13   CR 0.76  --  0.78   MIS 8.1*  --  8.6       Recent Labs   Lab Test 11/23/21  0712   WBC 10.3   RBC 5.25   HGB 11.2*   HCT 35.3*   MCV 67*   MCH 21.3*   MCHC 31.7   RDW 15.0          Recent Labs   Lab Test 05/07/21  0851   PROTTOTAL 7.1   ALBUMIN 3.9   BILITOTAL 0.9   ALKPHOS 52   AST 23   ALT 44       TSH (mU/L)   Date Value   01/21/2015 1.26   12/11/2013 0.77       No results found for: UAMP, UBARB, BENZODIAZEUR, UCANN, UCOC, OPIT, UPCP    No results found for: IRONSAT, [id number], EDINSON    No results found for: PH, PHARTERIAL, PO2, [id number], SAT, PCO2, HCO3, BASEEXCESS, ROSETTA, BEB    @LABRCNTIPR(phv:4,pco2v:4,po2v:4,hco3v:4,deangelo:4,o2per:4)@    Echocardiology: No results found for this or any previous visit (from the past 4320 hour(s)).    Chest x-ray: No results found for this or any previous visit from the past 365 days.      Chest CT: No results found for this or any previous visit from the past 365 days.      PFT: Most Recent [facility] Pulmonary Function Testing    No results found for: 20001      Bennett Ezra Goltz, PA-C, [facility] 1/25/2023

## 2023-01-25 ENCOUNTER — VIRTUAL VISIT (OUTPATIENT)
Dept: SLEEP MEDICINE | Facility: CLINIC | Age: 68
End: 2023-01-25
Payer: COMMERCIAL

## 2023-01-25 VITALS — WEIGHT: 145 LBS | BODY MASS INDEX: 21.98 KG/M2 | HEIGHT: 68 IN

## 2023-01-25 DIAGNOSIS — R06.83 SNORING: Primary | ICD-10-CM

## 2023-01-25 DIAGNOSIS — G47.30 OBSERVED SLEEP APNEA: ICD-10-CM

## 2023-01-25 DIAGNOSIS — R40.0 SLEEPINESS: ICD-10-CM

## 2023-01-25 PROCEDURE — 99205 OFFICE O/P NEW HI 60 MIN: CPT | Mod: 95 | Performed by: PHYSICIAN ASSISTANT

## 2023-01-25 ASSESSMENT — SLEEP AND FATIGUE QUESTIONNAIRES
HOW LIKELY ARE YOU TO NOD OFF OR FALL ASLEEP WHEN YOU ARE A PASSENGER IN A CAR FOR AN HOUR WITHOUT A BREAK: WOULD NEVER DOZE
HOW LIKELY ARE YOU TO NOD OFF OR FALL ASLEEP WHILE SITTING AND READING: MODERATE CHANCE OF DOZING
HOW LIKELY ARE YOU TO NOD OFF OR FALL ASLEEP WHILE LYING DOWN TO REST IN THE AFTERNOON WHEN CIRCUMSTANCES PERMIT: MODERATE CHANCE OF DOZING
HOW LIKELY ARE YOU TO NOD OFF OR FALL ASLEEP WHILE SITTING AND TALKING TO SOMEONE: WOULD NEVER DOZE
HOW LIKELY ARE YOU TO NOD OFF OR FALL ASLEEP WHILE SITTING INACTIVE IN A PUBLIC PLACE: WOULD NEVER DOZE
HOW LIKELY ARE YOU TO NOD OFF OR FALL ASLEEP IN A CAR, WHILE STOPPED FOR A FEW MINUTES IN TRAFFIC: WOULD NEVER DOZE
HOW LIKELY ARE YOU TO NOD OFF OR FALL ASLEEP WHILE WATCHING TV: MODERATE CHANCE OF DOZING
HOW LIKELY ARE YOU TO NOD OFF OR FALL ASLEEP WHILE SITTING QUIETLY AFTER LUNCH WITHOUT ALCOHOL: MODERATE CHANCE OF DOZING

## 2023-01-25 ASSESSMENT — PAIN SCALES - GENERAL: PAINLEVEL: NO PAIN (0)

## 2023-01-25 NOTE — PROGRESS NOTES
Francoise is a 67 year old who is being evaluated via a billable video visit.      How would you like to obtain your AVS? MyChart  If the video visit is dropped, the invitation should be resent by: Text to cell phone: [phone]  Will anyone else be joining your video visit? Jasmyn Chin      Video-Visit Details    Type of service:  Video Visit   Video Start Time: 9:05 AM  Video End Time:10:04 AM    Originating Location (pt. Location): Home    Distant Location (provider location):  Off-site  Platform used for Video Visit: QingKe

## 2023-01-25 NOTE — PATIENT INSTRUCTIONS
"      MY TREATMENT INFORMATION FOR SLEEP APNEA-  Jacjoseph Valerio    DOCTOR : Bennett Goltz, PA-C    Am I having a sleep study at a sleep center?  --->Due to normal delays, you will be contacted within 2-4 weeks to schedule    Am I having a home sleep study?  --->Watch the video for the device you are using:    -/drop off device-   https://www.[employer].com/watch?v=yGGFBdELGhk    Frequently asked questions:  1. What is Obstructive Sleep Apnea (ROMEL)? ROMEL is the most common type of sleep apnea. Apnea means, \"without breath.\"  Apnea is most often caused by narrowing or collapse of the upper airway as muscles relax during sleep.   Almost everyone has occasional apneas. Most people with sleep apnea have had brief interruptions at night frequently for many years.  The severity of sleep apnea is related to how frequent and severe the events are.   2. What are the consequences of ROMEL? Symptoms include: feeling sleepy during the day, snoring loudly, gasping or stopping of breathing, trouble sleeping, and occasionally morning headaches or heartburn at night.  Sleepiness can be serious and even increase the risk of falling asleep while driving. Other health consequences may include development of high blood pressure and other cardiovascular disease in persons who are susceptible. Untreated ROMEL  can contribute to heart disease, stroke and diabetes.   3. What are the treatment options? In most situations, sleep apnea is a lifelong disease that must be managed with daily therapy. Medications are not effective for sleep apnea and surgery is generally not considered until other therapies have been tried. Your treatment is your choice . Continuous Positive Airway (CPAP) works right away and is the therapy that is effective in nearly everyone. An oral device to hold your jaw forward is usually the next most reliable option. Other options include postioning devices (to keep you off your back), weight loss, and surgery including a " tongue pacing device. There is more detail about some of these options below.  4. Are my sleep studies covered by insurance? Although we will request verification of coverage, we advise you also check in advance of the study to ensure there is coverage.    Important tips for those choosing CPAP and similar devices   Know your equipment:  CPAP is continuous positive airway pressure that prevents obstructive sleep apnea by keeping the throat from collapsing while you are sleeping. In most cases, the device is  smart  and can slowly self-adjusts if your throat collapses and keeps a record every day of how well you are treated-this information is available to you and your care team.  BPAP is bilevel positive airway pressure that keeps your throat open and also assists each breath with a pressure boost to maintain adequate breathing.  Special kinds of BPAP are used in patients who have inadequate breathing from lung or heart disease. In most cases, the device is  smart  and can slowly self-adjusts to assist breathing. Like CPAP, the device keeps a record of how well you are treated.  Your mask is your connection to the device. You get to choose what feels most comfortable and the staff will help to make sure if fits. Here: are some examples of the different masks that are available:       Key points to remember on your journey with sleep apnea:  Sleep study.  PAP devices often need to be adjusted during a sleep study to show that they are effective and adjusted right.  Good tips to remember: Try wearing just the mask during a quiet time during the day so your body adapts to wearing it. A humidifier is recommended for comfort in most cases to prevent drying of your nose and throat. Allergy medication from your provider may help you if you are having nasal congestion.  Getting settled-in. It takes more than one night for most of us to get used to wearing a mask. Try wearing just the mask during a quiet time during the day  so your body adapts to wearing it. A humidifier is recommended for comfort in most cases. Our team will work with you carefully on the first day and will be in contact within 4 days and again at 2 and 4 weeks for advice and remote device adjustments. Your therapy is evaluated by the device each day.   Use it every night. The more you are able to sleep naturally for 7-8 hours, the more likely you will have good sleep and to prevent health risks or symptoms from sleep apnea. Even if you use it 4 hours it helps. Occasionally all of us are unable to use a medical therapy, in sleep apnea, it is not dangerous to miss one night.   Communicate. Call our skilled team on the number provided on the first day if your visit for problems that make it difficult to wear the device. Over 2 out of 3 patients can learn to wear the device long-term with help from our team. Remember to call our team or your sleep providers if you are unable to wear the device as we may have other solutions for those who cannot adapt to mask CPAP therapy. It is recommended that you sleep your sleep provider within the first 3 months and yearly after that if you are not having problems.   Use it for your health. We encourage use of CPAP masks during daytime quiet periods to allow your face and brain to adapt to the sensation of CPAP so that it will be a more natural sensation to awaken to at night or during naps. This can be very useful during the first few weeks or months of adapting to CPAP though it does not help medically to wear CPAP during wakefulness and  should not be used as a strategy just to meet guidelines.  Take care of your equipment. Make sure you clean your mask and tubing using directions every day and that your filter and mask are replaced as recommended or if they are not working.     BESIDES CPAP, WHAT OTHER THERAPIES ARE THERE?    Positioning Device  Positioning devices are generally used when sleep apnea is mild and only occurs on  your back.This example shows a pillow that straps around the waist. It may be appropriate for those whose sleep study shows milder sleep apnea that occurs primarily when lying flat on one's back. Preliminary studies have shown benefit but effectiveness at home may need to be verified by a home sleep test. These devices are generally not covered by medical insurance.  Examples of devices that maintain sleeping on the back to prevent snoring and mild sleep apnea.    Belt type body positioner  http://[employer]/    Electronic reminder  http://nightshifttherapy.com/            Oral Appliance  What is oral appliance therapy?  An oral appliance device fits on your teeth at night like a retainer used after having braces. The device is made by a specialized dentist and requires several visits over 1-2 months before a manufactured device is made to fit your teeth and is adjusted to prevent your sleep apnea. Once an oral device is working properly, snoring should be improved. A home sleep test may be recommended at that time if to determine whether the sleep apnea is adequately treated.       Some things to remember:  -Oral devices are often, but not always, covered by your medical insurance. Be sure to check with your insurance provider.   -If you are referred for oral therapy, you will be given a list of specialized dentists to consider or you may choose to visit the Web site of the American Academy of Dental Sleep Medicine  -Oral devices are less likely to work if you have severe sleep apnea or are extremely overweight.     More detailed information  An oral appliance is a small acrylic device that fits over the upper and lower teeth  (similar to a retainer or a mouth guard). This device slightly moves jaw forward, which moves the base of the tongue forward, opens the airway, improves breathing for effective treat snoring and obstructive sleep apnea in perhaps 7 out of 10 people .  The best working devices are  custom-made by a dental device  after a mold is made of the teeth 1, 2, 3.  When is an oral appliance indicated?  Oral appliance therapy is recommended as a first-line treatment for patients with primary snoring, mild sleep apnea, and for patients with moderate sleep apnea who prefer appliance therapy to use of CPAP4, 5. Severity of sleep apnea is determined by sleep testing and is based on the number of respiratory events per hour of sleep.   How successful is oral appliance therapy?  The success rate of oral appliance therapy in patients with mild sleep apnea is 75-80% while in patients with moderate sleep apnea it is 50-70%. The chance of success in patients with severe sleep apnea is 40-50%. The research also shows that oral appliances have a beneficial effect on the cardiovascular health of ROMEL patients at the same magnitude as CPAP therapy7.  Oral appliances should be a second-line treatment in cases of severe sleep apnea, but if not completely successful then a combination therapy utilizing CPAP plus oral appliance therapy may be effective. Oral appliances tend to be effective in a broad range of patients although studies show that the patients who have the highest success are females, younger patients, those with milder disease, and less severe obesity. 3, 6.   Finding a dentist that practices dental sleep medicine  Specific training is available through the American Academy of Dental Sleep Medicine for dentists interested in working in the field of sleep. To find a dentist who is educated in the field of sleep and the use of oral appliances, near you, visit the Web site of the American Academy of Dental Sleep Medicine.    References  1. Celia et al. Objectively measured vs self-reported compliance during oral appliance therapy for sleep-disordered breathing. Chest 2013; 144(5): 8949-5875.  2. Kashmir et al. Objective measurement of compliance during oral appliance therapy for  sleep-disordered breathing. Thorax 2013; 68(1): 91-96.  3. Christiana et al. Mandibular advancement devices in 620 men and women with ROMEL and snoring: tolerability and predictors of treatment success. Chest 2004; 125: 1895-1153.  4. Bijal et al. Oral appliances for snoring and ROMEL: a review. Sleep 2006; 29: 244-262.  5. Johny et al. Oral appliance treatment for ROMEL: an update. J Clin Sleep Med 2014; 10(2): 215-227.  6. Evan et al. Predictors of OSAH treatment outcome. J Dent Res 2007; 86: 8616-6974.      Weight Loss:    Weight loss is a long-term strategy that may improve sleep apnea in some patients.    Weight management is a personal decision and the decision should be based on your interest and the potential benefits.  If you are interested in exploring weight loss strategies, the following discussion covers the impact on weight loss on sleep apnea and the approaches that may be successful.    Being overweight does not necessarily mean you will have health consequences.  Those who have BMI over 35 or over 27 with existing medical conditions carries greater risk.   Weight loss decreases severity of sleep apnea in most people with obesity. For those with mild obesity who have developed snoring with weight gain, even 15-30 pound weight loss can improve and occasionally eliminate sleep apnea.  Structured and life-long dietary and health habits are necessary to lose weight and keep healthier weight levels.     Though there may be significant health benefits from weight loss, long-term weight loss is very difficult to achieve- studies show success with dietary management in less than 10% of people. In addition, substantial weight loss may require years of dietary control and may be difficult if patients have severe obesity. In these cases, surgical management may be considered.  Finally, older individuals who have tolerated obesity without health complications may be less likely to benefit from weight loss  strategies.      BMI 22    Surgery:    Surgery for obstructive sleep apnea is considered generally only when other therapies fail to work. Surgery may be discussed with you if you are having a difficult time tolerating CPAP and or when there is an abnormal structure that requires surgical correction.  Nose and throat surgeries often enlarge the airway to prevent collapse.  Most of these surgeries create pain for 1-2 weeks and up to half of the most common surgeries are not effective throughout life.  You should carefully discuss the benefits and drawbacks to surgery with your sleep provider and surgeon to determine if it is the best solution for you.   More information  Surgery for ROMEL is directed at areas that are responsible for narrowing or complete obstruction of the airway during sleep.  There are a wide range of procedures available to enlarge and/or stabilize the airway to prevent blockage of breathing in the three major areas where it can occur: the palate, tongue, and nasal regions.  Successful surgical treatment depends on the accurate identification of the factors responsible for obstructive sleep apnea in each person.  A personalized approach is required because there is no single treatment that works well for everyone.  Because of anatomic variation, consultation with an examination by a sleep surgeon is a critical first step in determining what surgical options are best for each patient.  In some cases, examination during sedation may be recommended in order to guide the selection of procedures.  Patients will be counseled about risks and benefits as well as the typical recovery course after surgery. Surgery is typically not a cure for a person s ROMEL.  However, surgery will often significantly improve one s ROMEL severity (termed  success rate ).  Even in the absence of a cure, surgery will decrease the cardiovascular risk associated with OSA7; improve overall quality of life8 (sleepiness, functionality,  sleep quality, etc).  Palate Procedures:  Patients with ROMEL often have narrowing of their airway in the region of their tonsils and uvula.  The goals of palate procedures are to widen the airway in this region as well as to help the tissues resist collapse.  Modern palate procedure techniques focus on tissue conservation and soft tissue rearrangement, rather than tissue removal.  Often the uvula is preserved in this procedure. Residual sleep apnea is common in patient after pharyngoplasty with an average reduction in sleep apnea events of 33%2.    Tongue Procedures:  ExamWhile patients are awake, the muscles that surround the throat are active and keep this region open for breathing. These muscles relax during sleep, allowing the tongue and other structures to collapse and block breathing.  There are several different tongue procedures available.  Selection of a tongue base procedure depends on characteristics seen on physical exam.  Generally, procedures are aimed at removing bulky tissues in this area or preventing the back of the tongue from falling back during sleep.  Success rates for tongue surgery range from 50-62%3.  Hypoglossal Nerve Stimulation:  Hypoglossal nerve stimulation has recently received approval from the [place] Food and Drug Administration for the treatment of obstructive sleep apnea.  This is based on research showing that the system was safe and effective in treating sleep apnea6.  Results showed that the median AHI score decreased 68%, from 29.3 to 9.0. This therapy uses an implant system that senses breathing patterns and delivers mild stimulation to airway muscles, which keeps the airway open during sleep.  The system consists of three fully implanted components: a small generator (similar in size to a pacemaker), a breathing sensor, and a stimulation lead.  Using a small handheld remote, a patient turns the therapy on before bed and off upon awakening.    Candidates for this device  must be greater than 18 years of age, have moderate to severe ROMEL (AHI between 15-65), BMI less than 35, have tried CPAP/oral appliance for at least 8 weeks without success, and have appropriate upper airway anatomy (determined by a sleep endoscopy performed by Dr. Caleb Silver).    Hypoglossal Nerve Stimulation Pathway:    The sleep surgeon s office will work with the patient through the insurance prior-authorization process (including communications and appeals).    Nasal Procedures:  Nasal obstruction can interfere with nasal breathing during the day and night.  Studies have shown that relief of nasal obstruction can improve the ability of some patients to tolerate positive airway pressure therapy for obstructive sleep apnea1.  Treatment options include medications such as nasal saline, topical corticosteroid and antihistamine sprays, and oral medications such as antihistamines or decongestants. Non-surgical treatments can include external nasal dilators for selected patients. If these are not successful by themselves, surgery can improve the nasal airway either alone or in combination with these other options.    Combination Procedures:  Combination of surgical procedures and other treatments may be recommended, particularly if patients have more than one area of narrowing or persistent positional disease.  The success rate of combination surgery ranges from 66-80%2,3.    References  Villa MEJIA. The Role of the Nose in Snoring and Obstructive Sleep Apnoea: An Update.  Eur Arch Otorhinolaryngol. 2011; 268: 1365-73.   Lindsay SM; Sherley JA; Yair JR; Pallanch JF; Alexander MB; Cintia SG; Venancio BRADLEYD. Surgical modifications of the upper airway for obstructive sleep apnea in adults: a systematic review and meta-analysis. SLEEP 2010;33(10):7545-7951. Rowan SIMS. Hypopharyngeal surgery in obstructive sleep apnea: an evidence-based medicine review.  Arch Otolaryngol Head Neck Surg. 2006 Feb;132(2):206-13.  Mirella Damon  Y, Eladio MAYTE. The efficacy of anatomically based multilevel surgery for obstructive sleep apnea. Otolaryngol Head Neck Surg. 2003 Oct;129(4):327-35.  Rowan E, Goldberg A. Hypopharyngeal Surgery in Obstructive Sleep Apnea: An Evidence-Based Medicine Review. Arch Otolaryngol Head Neck Surg. 2006 Feb;132(2):206-13.  Leanna PJ et al. Upper-Airway Stimulation for Obstructive Sleep Apnea.  N Engl J Med. 2014 Jan 9;370(2):139-49.  Jigar Y et al. Increased Incidence of Cardiovascular Disease in Middle-aged Men with Obstructive Sleep Apnea. Am J Respir Crit Care Med; 2002 166: 159-165  Martínez EM et al. Studying Life Effects and Effectiveness of Palatopharyngoplasty (SLEEP) study: Subjective Outcomes of Isolated Uvulopalatopharyngoplasty. Otolaryngol Head Neck Surg. 2011; 144: 623-631.  WHAT IF I ONLY HAVE SNORING?    Mandibular advancement devices, lateral sleep positioning, long-term weight loss and treatment of nasal allergies have been shown to improve snoring.  Exercising tongue muscles with a game ([employer]://apps.[employer]/us/ruth/soundly-reduce-snoring/jz7599919074) or stimulating the tongue during the day with a device (https://doi.org/10.3390/ljn98182793) have improved snoring in some individuals.    Remember to Drive Safe... Drive Alive     Sleep health profoundly affects your health, mood, and your safety.  Thirty three percent of the population (one in three of us) is not getting enough sleep and many have a sleep disorder. Not getting enough sleep or having an untreated / undertreated sleep condition may make us sleepy without even knowing it. In fact, our driving could be dramatically impaired due to our sleep health. As your provider, here are some things I would like you to know about driving:     Here are some warning signs for impairment and dangerous drowsy driving:              -Having been awake more than 16 hours               -Looking tired               -Eyelid drooping              -Head nodding (it  could be too late at this point)              -Driving for more than 30 minutes     Some things you could do to make the driving safer if you are experiencing some drowsiness:              -Stop driving and rest              -Call for transportation              -Make sure your sleep disorder is adequately treated     Some things that have been shown NOT to work when experiencing drowsiness while driving:              -Turning on the radio              -Opening windows              -Eating any  distracting  /  entertaining  foods (e.g., sunflower seeds, candy, or any other)              -Talking on the phone      Your decision may not only impact your life, but also the life of others. Please, remember to drive safe for yourself and all of us.

## 2023-01-31 ENCOUNTER — PATIENT OUTREACH (OUTPATIENT)
Dept: ONCOLOGY | Facility: CLINIC | Age: 68
End: 2023-01-31
Payer: COMMERCIAL

## 2023-01-31 NOTE — PROGRESS NOTES
[facility]: Cancer Care                                                                                          Called patient today to review the cancer treatment summary information that was sent to his mychart on 1/23/23. He states he did receive the information and reviewed it. He has no questions and no survivorship needs at this time.     Uyen Garcia RN BSN  Cancer Survivorship Coordinator

## 2023-02-14 ENCOUNTER — OFFICE VISIT (OUTPATIENT)
Dept: SLEEP MEDICINE | Facility: CLINIC | Age: 68
End: 2023-02-14
Attending: PHYSICIAN ASSISTANT
Payer: COMMERCIAL

## 2023-02-14 DIAGNOSIS — R40.0 SLEEPINESS: ICD-10-CM

## 2023-02-14 DIAGNOSIS — G47.30 OBSERVED SLEEP APNEA: ICD-10-CM

## 2023-02-14 DIAGNOSIS — R06.83 SNORING: ICD-10-CM

## 2023-02-14 PROCEDURE — G0399 HOME SLEEP TEST/TYPE 3 PORTA: HCPCS | Performed by: INTERNAL MEDICINE

## 2023-02-15 ENCOUNTER — DOCUMENTATION ONLY (OUTPATIENT)
Dept: SLEEP MEDICINE | Facility: CLINIC | Age: 68
End: 2023-02-15
Payer: COMMERCIAL

## 2023-02-15 NOTE — PROGRESS NOTES
HST POST-STUDY QUESTIONNAIRE    1. What time did you go to bed?  9:20 pm  2. How long do you think it took to fall asleep?  10 min  3. What time did you wake up to start the day?  4:10 am  4. Did you get up during the night at all?  Once at 12:10 am  5. If you woke up, do you remember approximately what time(s)? Went to bathroom at 12:10 am  6. Did you have any difficulty with the equipment?  No  7. Did you us any type of treatment with this study?  None  8. Was the head of the bed elevated? No  9. Did you sleep in a recliner?  No  10. Did you stop using CPAP at least 3 days before this test?  NA  11. Any other information you'd like us to know? No    This HSAT was performed using a Noxturnal T3 device which recorded snore, sound, movement activity, body position, nasal pressure, oronasal thermal airflow, pulse, oximetry and both chest and abdominal respiratory effort. HSAT data was restricted to the time patient states they were in bed.     HSAT was scored using 1B 4% hypopnea rule.     HST AHI (Non-PAT): 21.4  Snoring was reported as moderate and loud.  Time with SpO2 below 89% was 6.9 minutes.   Overall signal quality was good     Pt will follow up with sleep provider to determine appropriate therapy.

## 2023-02-15 NOTE — PROCEDURES
"HOME SLEEP STUDY INTERPRETATION        Patient: Francoise Valerio  MRN: [id number]  YOB: 1955  Study Date: 2023  PCP/Referring Provider: Tesfaye Kelley;   Ordering Provider: Bennett Goltz, PA-C         Indications for Home Study: Francoise Valerio is a 67 year old male presents with symptoms suggestive of obstructive sleep apnea.    Estimated body mass index is 22.05 kg/m  as calculated from the following:    Height as of 23: 1.727 m (5' 8\").    Weight as of 23: 65.8 kg (145 lb).  Total score - [place]: 8 (2023  8:10 AM)  STOP-BAN/8      Data: A full night home sleep study was performed recording the standard physiologic parameters including body position, movement, sound, nasal pressure, thermal oral airflow, chest and abdominal movements with respiratory inductance plethysmography, and oxygen saturation by pulse oximetry. Pulse rate was estimated by oximetry recording. This study was considered adequate based on > 4 hours of quality oximetry and respiratory recording. As specified by the AASM Manual for the Scoring of Sleep and Associated events, version 2.3, Rule VIII.D 1B, 4% oxygen desaturation scoring for hypopneas is used as a standard of care on all home sleep apnea testing.      Analysis Time:  455 minutes    Respiration:   Sleep Associated Hypoxemia: sustained hypoxemia was not present. Baseline oxygen saturation was 94%.  Time with saturation less than or equal to 88% was 5.4 minutes. The lowest oxygen saturation was 70%.   Snoring: Snoring was present.  Respiratory events: The home study revealed a presence of 92 obstructive apneas and 23 mixed and central apneas. There were 47 hypopneas resulting in a combined apnea/hypopnea index [AHI] of 21.4 events per hour.  AHI was 21.9 per hour supine, N/A per hour prone, 4.5 per hour on left side, and 20 per hour on right side.   Pattern: Excluding events noted above, respiratory rate and pattern was Normal.      Position: Percent of time " spent: supine - 94.5%, prone - 0%, on left - 2.9%, on right - 2.6%.      Heart Rate: By pulse oximetry normal rate was noted.       Assessment:     Moderate obstructive sleep apnea.    Recommendations:    CPAP therapy is the treatment of choice for moderate obstructive sleep apnea.  Treatment can be initiated with auto titrating CPAP therapy, with appropriate clinical follow-up in 1 to 2 months to monitor response, compliance and CPAP download data.    If CPAP is not tolerated or accepted, patient can consider oral appliance therapy through referral to dental sleep medicine.      Diagnosis Code(s): Obstructive Sleep Apnea G47.33    Sandeep Maldonado MD, February 15, 2023   Diplomate, American Board of Psychiatry and Neurology, Sleep Medicine

## 2023-04-28 ENCOUNTER — VIRTUAL VISIT (OUTPATIENT)
Dept: SLEEP MEDICINE | Facility: CLINIC | Age: 68
End: 2023-04-28
Payer: COMMERCIAL

## 2023-04-28 VITALS — HEIGHT: 68 IN | BODY MASS INDEX: 21.22 KG/M2 | WEIGHT: 140 LBS

## 2023-04-28 DIAGNOSIS — G47.33 OSA (OBSTRUCTIVE SLEEP APNEA): Primary | ICD-10-CM

## 2023-04-28 PROCEDURE — 99214 OFFICE O/P EST MOD 30 MIN: CPT | Mod: VID | Performed by: NURSE PRACTITIONER

## 2023-04-28 ASSESSMENT — SLEEP AND FATIGUE QUESTIONNAIRES
HOW LIKELY ARE YOU TO NOD OFF OR FALL ASLEEP WHILE LYING DOWN TO REST IN THE AFTERNOON WHEN CIRCUMSTANCES PERMIT: MODERATE CHANCE OF DOZING
HOW LIKELY ARE YOU TO NOD OFF OR FALL ASLEEP WHILE WATCHING TV: MODERATE CHANCE OF DOZING
HOW LIKELY ARE YOU TO NOD OFF OR FALL ASLEEP WHILE SITTING QUIETLY AFTER LUNCH WITHOUT ALCOHOL: HIGH CHANCE OF DOZING
HOW LIKELY ARE YOU TO NOD OFF OR FALL ASLEEP IN A CAR, WHILE STOPPED FOR A FEW MINUTES IN TRAFFIC: WOULD NEVER DOZE
HOW LIKELY ARE YOU TO NOD OFF OR FALL ASLEEP WHEN YOU ARE A PASSENGER IN A CAR FOR AN HOUR WITHOUT A BREAK: SLIGHT CHANCE OF DOZING
HOW LIKELY ARE YOU TO NOD OFF OR FALL ASLEEP WHILE SITTING AND TALKING TO SOMEONE: WOULD NEVER DOZE
HOW LIKELY ARE YOU TO NOD OFF OR FALL ASLEEP WHILE SITTING INACTIVE IN A PUBLIC PLACE: WOULD NEVER DOZE
HOW LIKELY ARE YOU TO NOD OFF OR FALL ASLEEP WHILE SITTING AND READING: HIGH CHANCE OF DOZING

## 2023-04-28 ASSESSMENT — PAIN SCALES - GENERAL: PAINLEVEL: NO PAIN (0)

## 2023-04-28 NOTE — PROGRESS NOTES
"Virtual Visit Details    Type of service:  Video Visit   Video Start Time: 10:04 AM  Video End Time:10:30 AM    Originating Location (pt. Location): Home    Distant Location (provider location):  Off-site  Platform used for Video Visit: [employer]    Home Sleep Apnea Testing Results Visit:    Chief Complaint   Patient presents with     Video Visit     Study Results     HST       Francoise Valerio is a 67 year old male who returns to [facility] after having had Home Sleep Apnea Testing.  He presented with symptoms suggestive of obstructive sleep apnea.  The patient was initially seen on 1/25/2023 for virtual visit with Bennett Goltz, PA-C, for his initial sleep consultation.    Estimated body mass index is 21.29 kg/m  as calculated from the following:    Height as of this encounter: 1.727 m (5' 8\").    Weight as of this encounter: 63.5 kg (140 lb).    [place] Sleepiness Scale: 11/24   Total Score: 5 (1/25/2023  8:12 AM)  Insomnia Severity Index: 13/28    Home Sleep Apnea Testing - 2/14/2023: 145 lbs   Analysis Time:  455 minutes     Respiration:   Sleep Associated Hypoxemia: sustained hypoxemia was not present. Baseline oxygen saturation was 94%.  Time with saturation less than or equal to 88% was 5.4 minutes. The lowest oxygen saturation was 70%.   Snoring: Snoring was present. Snoring was reported as moderate and loud.  Respiratory events: The home study revealed a presence of 92 obstructive apneas and 23 mixed and central apneas. There were 47 hypopneas resulting in a combined apnea/hypopnea index [AHI] of 21.4 events per hour.  AHI was 21.9 per hour supine, N/A per hour prone, 4.5 per hour on left side, and 20 per hour on right side.   Pattern: Excluding events noted above, respiratory rate and pattern was Normal.        Position: Percent of time spent: supine - 94.5%, prone - 0%, on left - 2.9%, on right - 2.6%.        Heart Rate: By pulse oximetry normal rate was noted.         Assessment: " "    Moderate obstructive sleep apnea.     Recommendations:    CPAP therapy is the treatment of choice for moderate obstructive sleep apnea.  Treatment can be initiated with auto titrating CPAP therapy, with appropriate clinical follow-up in 1 to 2 months to monitor response, compliance and CPAP download data.    If CPAP is not tolerated or accepted, patient can consider oral appliance therapy through referral to dental sleep medicine.        Diagnosis Code(s): Obstructive Sleep Apnea G47.33    Overall signal quality was Good.     Francoise Valerio reports that he slept Fair .     Home Sleep Apnea Testing was reviewed in detail today with Francoise and a copy given to him for his records.    Past medical/surgical history, family history, social history, medications and allergies were reviewed.    Patient Active Problem List   Diagnosis     CARDIOVASCULAR SCREENING; LDL GOAL LESS THAN 160     Relapsing polychondritis     Advanced directives, counseling/discussion     Vitamin D deficiency     Prediabetes     Elevated prostate specific antigen (PSA)     Prostate cancer (H)     No current outpatient medications on file.     No current facility-administered medications for this visit.     Ht 1.727 m (5' 8\")   Wt 63.5 kg (140 lb)   BMI 21.29 kg/m      Impression/Plan:  Moderate Obstructive Sleep Apnea.   Sleep associated hypoxemia was not present.  - Comprehensive DME     Treatment options discussed today including  auto-CPAP at 5-15 cmH2O or oral appliance therapy.    Elected treatment with auto-CPAP at 5-15 cmH2O. A comprehensive DME order was placed for new APAP device, nasal pillow mask, and supplies sent to [facility], today.  We also discussed usual use/compliance requirements associated with new PAP device therapy.    Follow-up with primary sleep provider, Bennett Goltz, PA-C, in approximately 2 months after obtaining new APAP device to review download data and use/compliance.    36 minutes spent " with patient with >50% spent in counseling, chart review/documentation, and coordination of care on the date of the encounter.      TACHO Mercado CNP  Sleep Medicine      CC:  Tesfaye Kelley,     This note was written with the assistance of the Dragon voice-dictation technology software. The final document, although reviewed, may contain errors. For corrections, please contact the office.

## 2023-04-28 NOTE — NURSING NOTE
Flu vaccine 11/23/2022    Is the patient currently in the state of [place]? YES    Visit mode:VIDEO    If the visit is dropped, the patient can be reconnected by: VIDEO VISIT: Send to e-mail at: [email or web address]    Will anyone else be joining the visit? NO      How would you like to obtain your AVS? MyChart    Are changes needed to the allergy or medication list? NO    Reason for visit: Video Visit    Alyx Zavaleta

## 2023-04-28 NOTE — PATIENT INSTRUCTIONS
"MY INFORMATION ON SLEEP APNEA-  Francoise Valerio    DOCTOR : TACHO Mercado CNP  SLEEP CENTER :      MY CONTACT NUMBER:   [facility]  [phone]  [facility]   [phone]  [facility]   [phone]      [facility]  [phone]  [facility]   [phone]    [facility] [place] [address], [address]  [place] [address]  Phone: [phone]    Hours:  Mon - Fri: 8:00 a.m. - 4:30 p.m.  Sat: Closed  Sun: Closed      Key Points:  1. What is Obstructive Sleep Apnea (ROMEL)? ROMEL is the most common type of sleep apnea. Apnea literally means, \"without breath.\" It is characterized by repetitive pauses in breathing, despite continued effort to breathe, and is usually associated with a reduction in blood oxygen saturation. Apneas can last 10 to over 60 seconds. It is caused by narrowing or collapse of the upper airway as muscles relax during sleep.   2. What are the consequences of ROMEL? Symptoms include: daytime sleepiness- possibly increasing the risk of falling asleep while driving, unrefreshing/restless sleep, snoring, insomnia, waking frequently to urinate, waking with heartburn or reflux, reduced concentration and memory, and morning headaches. Other health consequences may include development of high blood pressure. Untreated ROMEL also can contribute to heart disease, stroke and diabetes.   3. What are the treatment options? In most situations, sleep apnea is a lifelong disease that must be managed with daily therapy. Continuous Positive Airway (CPAP) is the most reliable treatment. A mouthguard to hold your jaw forward is usually the next most reliable option. Other options include postioning devices (to keep you off your back), nasal valves, tongue retaining device, weight loss, surgery. There is more detail about these options toward the end of this " document.  4. What are the most important things to remember about using CPAP?     WHERE CAN I FIND MORE INFORMATION?    American Academy of Sleep Medicine Patient information on sleep disorders:  http://yoursleep.aasmnet.org    CPAP -  WHY AND HOW?                 Continuous positive airway pressure, or CPAP, is the most effective treatment for obstructive sleep apnea. It works by blowing room air, through a mask, to hold your throat open. A decision to use CPAP is a major step forward in the pursuit of a healthier life. The successful use of CPAP will help you breathe easier, sleep better and live healthier. Using CPAP can be a positive experience if you keep these valenzuela points in mind:  Commitment  CPAP is not a quick fix for your problem. It involves a long-term commitment to improve your sleep and your health.    Communication  Stay in close communication with both your sleep doctor and your CPAP supplier. Ask lots of questions and seek help when you need it.    Consistency  Use CPAP all night, every night and for every nap. You will receive the maximum health benefits from CPAP when you use it every time that you sleep. This will also make it easier for your body to adjust to the treatment.    Correction  The first machine and mask that you try may not be the best ones for you. Work with your sleep doctor and your CPAP supplier to make corrections to your equipment selection. Ask about trying a different type of machine or mask if you have ongoing problems. Make sure that your mask is a good fit and learn to use your equipment properly.    Challenge  Tell a family member or close friend to ask you each morning if you used your CPAP the previous night. Have someone to challenge you to give it your best effort.    Connection   Your adjustment to CPAP will be easier if you are able to connect with others who use the same treatment. Ask your sleep doctor if there is a support group in your area for people who have  "sleep apnea, or look for one on the Internet.  Comfort   Increase your level of comfort by using a saline spray, decongestant or heated humidifier if CPAP irritates your nose, mouth or throat. Use your unit's \"ramp\" setting to slowly get used to the air pressure level. There may be soft pads you can buy that will fit over your mask straps. Look on www.CPAP.com for accessories that can help make CPAP use more comfortable.  Cleaning   Clean your mask, tubing and headgear on a regular basis. Put this time in your schedule so that you don't forget to do it. Check and replace the filters for your CPAP unit and humidifier.    Completion   Although you are never finished with CPAP therapy, you should reward yourself by celebrating the completion of your first month of treatment. Expect this first month to be your hardest period of adjustment. It will involve some trial and error as you find the machine, mask and pressure settings that are right for you.    Continuation  After your first month of treatment, continue to make a daily commitment to use your CPAP all night, every night and for every nap.    CPAP-Tips to starting with success:  Begin using your CPAP for short periods of time during the day while you watch TV or read.    Use CPAP every night and for every nap. Using it less often reduces the health benefits and makes it harder for your body to get used to it.    Newer CPAP models are virtually silent; however, if you find the sound of your CPAP machine to be bothersome, place the unit under your bed to dampen the sound.     Make small adjustments to your mask, tubing, straps and headgear until you get the right fit. Tightening the mask may actually worsen the leak.  If it leaves significant marks on your face or irritates the bridge of your nose, it may not be the best mask for you.  Speak with the person who supplied the mask and consider trying other masks. Insurances will allow you to try different masks " during the first month of starting CPAP.  Insurance also covers a new mask, hose and filter about every 6 months.    Use a saline nasal spray to ease mild nasal congestion. Neti-Pot or saline nasal rinses may also help. Nasal gel sprays can help reduce nasal dryness.  Biotene mouthwash can be helpful to protect your teeth if you experience frequent dry mouth.  Dry mouth may be a sign of air escaping out of your mouth or out of the mask in the case of a full face mask.  Speak with your provider if you expect that is the case.     Take a nasal decongestant to relieve more severe nasal or sinus congestion.  Do not use Afrin (oxymetazoline) nasal spray more than 3 days in a row.  Speak with your sleep doctor if your nasal congestion is chronic.    Use a heated humidifier that fits your CPAP model to enhance your breathing comfort. Adjust the heat setting up if you get a dry nose or throat, down if you get condensation in the hose or mask.  Position the CPAP lower than you so that any condensation in the hose drains back into the machine rather than towards the mask.    Try a system that uses nasal pillows if traditional masks give you problems.    Clean your mask, tubing and headgear once a week. Make sure the equipment dries fully.    Regularly check and replace the filters for your CPAP unit and humidifier.    Work closely with your sleep provider and your CPAP supplier to make sure that you have the machine, mask and air pressure setting that works best for you. It is better to stop using it and call your provider to solve problems than to lay awake all night frustrated with the device.    Weight Loss:    Weight loss decreases severity of sleep apnea in most people with obesity. For those with mild obesity who have developed snoring with weight gain, even 15-30 pound weight loss can improve and occasionally eliminate sleep apnea.  Structured and life-long dietary and health habits are necessary to lose weight and  keep healthier weight levels.     Though there are significant health benefits from weight loss, long-term weight loss is very difficult to achieve- studies show success with dietary management in less than 10% of people. In addition, substantial weight loss may require years of dietary control and may be difficult if patients have severe obesity. In these cases, surgical management may be considered.    If you are interested in methods for weight loss, you should review the options discussed at the National Institutes of Health patient information sites:     http://win.niddk.nih.gov/publications/index.htm  http:/www.health.nih.gov/topic/WeightLossDieting    Bariatric programs offer counseling in all methods of weight loss:    Http:/www.[facility]edic[facility].org/Specialties/WeightLossSurgeryandMedicalMgmt/htm    Your BMI is Body mass index is 21.29 kg/m .    Body mass index (BMI) is one way to tell whether you are at a healthy weight, overweight, or obese. It measures your weight in relation to your height.  A BMI of 18.5 to 24.9 is in the healthy range. A person with a BMI of 25 to 29.9 is considered overweight, and someone with a BMI of 30 or greater is considered obese.  Another way to find out if you are at risk for health problems caused by overweight and obesity is to measure your waist. If you are a woman and your waist is more than 35 inches, or if you are a man and your waist is more than 40 inches, your risk of disease may be higher.  More than two-thirds of American adults are considered overweight or obese. Being overweight or obese increases the risk for further weight gain.  Excess weight may lead to heart disease and diabetes. Creating and following plans for healthy eating and physical activity may help you improve your health.    Methods for maintaining or losing weight.    Weight control is part of healthy lifestyle and includes exercise, emotional health, and healthy eating habits.  Careful eating  habits lifelong is the mainstay of weight control.  Though there are significant health benefits from weight loss, long-term weight loss with diet alone may be very difficult to achieve- studies show long-term success with dietary management in less than 10% of people. Attaining a healthy weight may be especially difficult to achieve in those with severe obesity. In some cases, medications, devices and surgical management might be considered.    What can you do?    If you are overweight or obese and are interested in methods for weight loss, you should discuss this with your provider. In addition, we recommend that you review healthy life styles and methods for weight loss available through the National Institutes of Health patient information sites:     http://win.niddk.nih.gov/publications/index.htm

## 2023-05-22 ENCOUNTER — DOCUMENTATION ONLY (OUTPATIENT)
Dept: SLEEP MEDICINE | Facility: CLINIC | Age: 68
End: 2023-05-22
Payer: COMMERCIAL

## 2023-05-22 DIAGNOSIS — G47.33 OBSTRUCTIVE SLEEP APNEA (ADULT) (PEDIATRIC): Primary | ICD-10-CM

## 2023-05-23 NOTE — PROGRESS NOTES
Patient was offered choice of vendor and chose [facility].  Patient Francoise Valerio was set up at [facility]  on May 23, 2023. Patient received a Resmed Airsense 10 Pressures were set at 5-15 cm H2O.   Patient s ramp is 5 cm H2O for Auto and FLEX/EPR is 2.  Patient received a Resmed Mask name: AirFit [id number]  Nasal mask size Large, heated tubing and heated humidifier.  Patient has the following compliance requirements: none    Delmy Bolivar

## 2023-06-19 ENCOUNTER — LAB (OUTPATIENT)
Dept: LAB | Facility: CLINIC | Age: 68
End: 2023-06-19
Payer: COMMERCIAL

## 2023-06-19 DIAGNOSIS — C61 PROSTATE CANCER (H): ICD-10-CM

## 2023-06-19 LAB — PSA SERPL-MCNC: <0.04 UG/L (ref 0–4)

## 2023-06-19 PROCEDURE — 84153 ASSAY OF PSA TOTAL: CPT

## 2023-06-19 PROCEDURE — 36415 COLL VENOUS BLD VENIPUNCTURE: CPT

## 2023-07-02 ENCOUNTER — HEALTH MAINTENANCE LETTER (OUTPATIENT)
Age: 68
End: 2023-07-02

## 2023-08-15 ENCOUNTER — ALLIED HEALTH/NURSE VISIT (OUTPATIENT)
Dept: FAMILY MEDICINE | Facility: CLINIC | Age: 68
End: 2023-08-15
Payer: COMMERCIAL

## 2023-08-15 DIAGNOSIS — Z23 ENCOUNTER FOR IMMUNIZATION: Primary | ICD-10-CM

## 2023-08-15 PROCEDURE — 90715 TDAP VACCINE 7 YRS/> IM: CPT

## 2023-08-15 PROCEDURE — 90471 IMMUNIZATION ADMIN: CPT

## 2023-08-15 PROCEDURE — 99207 PR NO CHARGE NURSE ONLY: CPT

## 2023-08-15 NOTE — PROGRESS NOTES
Prior to immunization administration, verified patients identity using patient s name and date of birth. Please see Immunization Activity for additional information.     Screening Questionnaire for Adult Immunization    Are you sick today?   No   Do you have allergies to medications, food, a vaccine component or latex?  No   Have you ever had a serious reaction after receiving a vaccination?   No   Do you have a long-term health problem with heart, lung, kidney, or metabolic disease (e.g., diabetes), asthma, a blood disorder, no spleen, complement component deficiency, a cochlear implant, or a spinal fluid leak?  Are you on long-term aspirin therapy?   No   Do you have cancer, leukemia, HIV/AIDS, or any other immune system problem?   No   Do you have a parent, brother, or sister with an immune system problem?   No   In the past 3 months, have you taken medications that affect  your immune system, such as prednisone, other steroids, or anticancer drugs; drugs for the treatment of rheumatoid arthritis, Crohn s disease, or psoriasis; or have you had radiation treatments?   No   Have you had a seizure, or a brain or other nervous system problem?   No   During the past year, have you received a transfusion of blood or blood    products, or been given immune (gamma) globulin or antiviral drug?   No   For women: Are you pregnant or is there a chance you could become       pregnant during the next month?   No   Have you received any vaccinations in the past 4 weeks?   No     Immunization questionnaire answers were all negative.    I have reviewed the following standing orders:   This patient is due and qualifies for a TDAP vaccine.    Click here for Tdap Standing Order    I have reviewed the vaccines inclusion and exclusion criteria; No concerns regarding eligibility.     Patient instructed to remain in clinic for 15 minutes afterwards, and to report any adverse reactions.     Screening performed by Beth Bentley MA on  8/15/2023 at 1:56 PM.

## 2023-09-11 ENCOUNTER — OFFICE VISIT (OUTPATIENT)
Dept: FAMILY MEDICINE | Facility: CLINIC | Age: 68
End: 2023-09-11
Payer: COMMERCIAL

## 2023-09-11 VITALS
SYSTOLIC BLOOD PRESSURE: 138 MMHG | TEMPERATURE: 98.8 F | HEIGHT: 68 IN | OXYGEN SATURATION: 97 % | BODY MASS INDEX: 21.09 KG/M2 | WEIGHT: 139.13 LBS | HEART RATE: 81 BPM | RESPIRATION RATE: 16 BRPM | DIASTOLIC BLOOD PRESSURE: 52 MMHG

## 2023-09-11 DIAGNOSIS — Z00.00 HEALTH MAINTENANCE EXAMINATION: Primary | ICD-10-CM

## 2023-09-11 DIAGNOSIS — C61 PROSTATE CANCER (H): ICD-10-CM

## 2023-09-11 DIAGNOSIS — Z23 NEED FOR SHINGLES VACCINE: ICD-10-CM

## 2023-09-11 DIAGNOSIS — Z12.5 SCREENING FOR PROSTATE CANCER: ICD-10-CM

## 2023-09-11 LAB
ALBUMIN SERPL BCG-MCNC: 4.4 G/DL (ref 3.5–5.2)
ALP SERPL-CCNC: 47 U/L (ref 40–129)
ALT SERPL W P-5'-P-CCNC: 24 U/L (ref 0–70)
ANION GAP SERPL CALCULATED.3IONS-SCNC: 10 MMOL/L (ref 7–15)
AST SERPL W P-5'-P-CCNC: 24 U/L (ref 0–45)
BILIRUB SERPL-MCNC: 0.5 MG/DL
BUN SERPL-MCNC: 16.3 MG/DL (ref 8–23)
CALCIUM SERPL-MCNC: 9.5 MG/DL (ref 8.8–10.2)
CHLORIDE SERPL-SCNC: 98 MMOL/L (ref 98–107)
CHOLEST SERPL-MCNC: 207 MG/DL
CREAT SERPL-MCNC: 0.8 MG/DL (ref 0.67–1.17)
DEPRECATED HCO3 PLAS-SCNC: 27 MMOL/L (ref 22–29)
EGFRCR SERPLBLD CKD-EPI 2021: >90 ML/MIN/1.73M2
ERYTHROCYTE [DISTWIDTH] IN BLOOD BY AUTOMATED COUNT: 15.5 % (ref 10–15)
GLUCOSE SERPL-MCNC: 108 MG/DL (ref 70–99)
HCT VFR BLD AUTO: 40.2 % (ref 40–53)
HDLC SERPL-MCNC: 76 MG/DL
HGB BLD-MCNC: 12.8 G/DL (ref 13.3–17.7)
LDLC SERPL CALC-MCNC: 120 MG/DL
MCH RBC QN AUTO: 21.3 PG (ref 26.5–33)
MCHC RBC AUTO-ENTMCNC: 31.8 G/DL (ref 31.5–36.5)
MCV RBC AUTO: 67 FL (ref 78–100)
NONHDLC SERPL-MCNC: 131 MG/DL
PLATELET # BLD AUTO: 223 10E3/UL (ref 150–450)
POTASSIUM SERPL-SCNC: 4.6 MMOL/L (ref 3.4–5.3)
PROT SERPL-MCNC: 6.8 G/DL (ref 6.4–8.3)
PSA SERPL DL<=0.01 NG/ML-MCNC: <0.01 NG/ML (ref 0–4.5)
RBC # BLD AUTO: 6 10E6/UL (ref 4.4–5.9)
SODIUM SERPL-SCNC: 135 MMOL/L (ref 136–145)
TRIGL SERPL-MCNC: 54 MG/DL
WBC # BLD AUTO: 5.8 10E3/UL (ref 4–11)

## 2023-09-11 PROCEDURE — G0103 PSA SCREENING: HCPCS | Performed by: FAMILY MEDICINE

## 2023-09-11 PROCEDURE — 80061 LIPID PANEL: CPT | Performed by: FAMILY MEDICINE

## 2023-09-11 PROCEDURE — 90472 IMMUNIZATION ADMIN EACH ADD: CPT | Performed by: FAMILY MEDICINE

## 2023-09-11 PROCEDURE — 36415 COLL VENOUS BLD VENIPUNCTURE: CPT | Performed by: FAMILY MEDICINE

## 2023-09-11 PROCEDURE — 90662 IIV NO PRSV INCREASED AG IM: CPT | Performed by: FAMILY MEDICINE

## 2023-09-11 PROCEDURE — 80053 COMPREHEN METABOLIC PANEL: CPT | Performed by: FAMILY MEDICINE

## 2023-09-11 PROCEDURE — 90471 IMMUNIZATION ADMIN: CPT | Performed by: FAMILY MEDICINE

## 2023-09-11 PROCEDURE — 90677 PCV20 VACCINE IM: CPT | Performed by: FAMILY MEDICINE

## 2023-09-11 PROCEDURE — 99397 PER PM REEVAL EST PAT 65+ YR: CPT | Mod: 25 | Performed by: FAMILY MEDICINE

## 2023-09-11 PROCEDURE — 85027 COMPLETE CBC AUTOMATED: CPT | Performed by: FAMILY MEDICINE

## 2023-09-11 ASSESSMENT — ENCOUNTER SYMPTOMS
HEMATOCHEZIA: 0
SHORTNESS OF BREATH: 0
NERVOUS/ANXIOUS: 0
HEMATURIA: 0
WEAKNESS: 0
PALPITATIONS: 0
DYSURIA: 0
HEARTBURN: 0
SORE THROAT: 0
FREQUENCY: 1
CHILLS: 0
MYALGIAS: 0
PARESTHESIAS: 0
JOINT SWELLING: 0
DIZZINESS: 0
FEVER: 0
CONSTIPATION: 0
ABDOMINAL PAIN: 0
ARTHRALGIAS: 0
COUGH: 0
EYE PAIN: 0
DIARRHEA: 0
NAUSEA: 0
HEADACHES: 0

## 2023-09-11 ASSESSMENT — ACTIVITIES OF DAILY LIVING (ADL): CURRENT_FUNCTION: NO ASSISTANCE NEEDED

## 2023-09-11 ASSESSMENT — PAIN SCALES - GENERAL: PAINLEVEL: NO PAIN (0)

## 2023-09-11 NOTE — PROGRESS NOTES
"SUBJECTIVE:   CC: Francoise is an 68 year old who presents for preventative health visit.       9/11/2023     1:25 PM   Additional Questions   Roomed by Maddie ALICEA   Pt is on Medicare Part A only.  Coverage is through his employer    Healthy Habits:     In general, how would you rate your overall health?  Good    Frequency of exercise:  6-7 days/week    Duration of exercise:  30-45 minutes    Do you usually eat at least 4 servings of fruit and vegetables a day, include whole grains    & fiber and avoid regularly eating high fat or \"junk\" foods?  Yes    Taking medications regularly:  Not Applicable    Medication side effects:  Not applicable    Ability to successfully perform activities of daily living:  No assistance needed    Home Safety:  No safety concerns identified    Hearing Impairment:  Difficulty following a conversation in a noisy restaurant or crowded room    In the past 6 months, have you been bothered by leaking of urine?  No    In general, how would you rate your overall mental or emotional health?  Good    Additional concerns today:  No      Today's PHQ-2 Score:       9/11/2023     1:08 PM   PHQ-2 ( 1999 [employer])   Q1: Little interest or pleasure in doing things 0   Q2: Feeling down, depressed or hopeless 0   PHQ-2 Score 0   Q1: Little interest or pleasure in doing things Not at all   Q2: Feeling down, depressed or hopeless Not at all   PHQ-2 Score 0       Have you ever done Advance Care Planning? (For example, a Health Directive, POLST, or a discussion with a medical provider or your loved ones about your wishes): Yes, patient states has an Advance Care Planning document and will bring a copy to the clinic.    Social History     Tobacco Use     Smoking status: Never     Smokeless tobacco: Never   Substance Use Topics     Alcohol use: Yes     Comment: red wine, may have a tablespoon             9/11/2023     1:07 PM   Alcohol Use   Prescreen: >3 drinks/day or >7 drinks/week? Not Applicable          No data to " display                Last PSA:   PSA   Date Value Ref Range Status   05/07/2021 9.31 (H) 0 - 4 ug/L Final     Comment:     Assay Method:  Chemiluminescence using Siemens Vista analyzer     PSA Tumor Marker   Date Value Ref Range Status   06/19/2023 <0.04 0.00 - 4.00 ug/L Final       Reviewed orders with patient. Reviewed health maintenance and updated orders accordingly - Yes  BP Readings from Last 3 Encounters:   09/11/23 138/52   12/19/22 (!) 143/91   06/17/22 138/74    Wt Readings from Last 3 Encounters:   09/11/23 63.1 kg (139 lb 2 oz)   04/28/23 63.5 kg (140 lb)   01/25/23 65.8 kg (145 lb)                  Patient Active Problem List   Diagnosis     CARDIOVASCULAR SCREENING; LDL GOAL LESS THAN 160     Relapsing polychondritis     Advanced directives, counseling/discussion     Vitamin D deficiency     Prediabetes     Elevated prostate specific antigen (PSA)     Prostate cancer (H)     Past Surgical History:   Procedure Laterality Date     DAVINCI PROSTATECTOMY Bilateral 11/22/2021    Procedure: ROBOTIC ASSISTED LAPAROSCOPIC RADICAL PROSTATECTOMY WITH BILATERAL PELVIC LYMPH NODE DISSECTION;  Surgeon: Avila Whaley MD;  Location: [facility] OR     ENT SURGERY      surgery on cartilage on ears and nose     PROSTATE SURGERY       TONSILLECTOMY         Social History     Tobacco Use     Smoking status: Never     Smokeless tobacco: Never   Substance Use Topics     Alcohol use: Yes     Comment: red wine, may have a tablespoon     Family History   Problem Relation Age of Onset     Heart Disease Mother         tachycardia/palpitations         No current outpatient medications on file.     Allergies   Allergen Reactions     Penicillins      Recent Labs   Lab Test 11/23/21  0712 05/07/21  0851 02/06/20  1046 03/14/19  1009 02/19/18  1053   A1C  --   --  6.0* 6.4* 6.2*   LDL  --  135* 129* 117* 99   HDL  --  58 50 48 46   TRIG  --  79 89 84 121   ALT  --  44 32 25 33   CR 0.76 0.78 0.76 0.77 0.70   GFRESTIMATED >90 >90 >90 >90  ">90   GFRESTBLACK  --  >90 >90 >90 >90   POTASSIUM 3.7 4.1 4.1 4.4 4.1        Reviewed and updated as needed this visit by clinical staff   Tobacco  Allergies  Meds              Reviewed and updated as needed this visit by Provider                 Past Medical History:   Diagnosis Date     Anemia, unspecified      Extrinsic asthma, unspecified      Other disorders of bone and cartilage(733.99)     polychondritis on the ear     Other specified disorders of pancreatic internal secretion       Past Surgical History:   Procedure Laterality Date     DAVINCI PROSTATECTOMY Bilateral 11/22/2021    Procedure: ROBOTIC ASSISTED LAPAROSCOPIC RADICAL PROSTATECTOMY WITH BILATERAL PELVIC LYMPH NODE DISSECTION;  Surgeon: Avila Whaley MD;  Location: [facility] OR     ENT SURGERY      surgery on cartilage on ears and nose     PROSTATE SURGERY       TONSILLECTOMY         Review of Systems   Constitutional:  Negative for chills and fever.   HENT:  Negative for congestion, ear pain, hearing loss and sore throat.    Eyes:  Negative for pain and visual disturbance.   Respiratory:  Negative for cough and shortness of breath.    Cardiovascular:  Negative for chest pain, palpitations and peripheral edema.   Gastrointestinal:  Negative for abdominal pain, constipation, diarrhea, heartburn, hematochezia and nausea.   Genitourinary:  Positive for frequency. Negative for dysuria, genital sores, hematuria, impotence, penile discharge and urgency.   Musculoskeletal:  Negative for arthralgias, joint swelling and myalgias.   Skin:  Negative for rash.   Neurological:  Negative for dizziness, weakness, headaches and paresthesias.   Psychiatric/Behavioral:  Negative for mood changes. The patient is not nervous/anxious.          OBJECTIVE:   /52 (BP Location: Right arm, Patient Position: Sitting, Cuff Size: Adult Regular)   Pulse 81   Temp 98.8  F (37.1  C) (Temporal)   Resp 16   Ht 1.727 m (5' 8\")   Wt 63.1 kg (139 lb 2 oz)   SpO2 97%   BMI " 21.15 kg/m      Physical Exam  GENERAL: healthy, alert and no distress  EYES: Eyes grossly normal to inspection, PERRL and conjunctivae and sclerae normal  HENT: ear canals and TM's normal, nose and mouth without ulcers or lesions  NECK: no adenopathy, no asymmetry, masses, or scars and thyroid normal to palpation  RESP: lungs clear to auscultation - no rales, rhonchi or wheezes  CV: regular rate and rhythm, normal S1 S2, no S3 or S4, no murmur, click or rub, no peripheral edema and peripheral pulses strong  ABDOMEN: soft, nontender, no hepatosplenomegaly, no masses and bowel sounds normal  MS: no gross musculoskeletal defects noted, no edema  NEURO: Normal strength and tone, mentation intact and speech normal  BACK: no CVA tenderness, no paralumbar tenderness  LYMPH: no cervical, supraclavicular, axillary, or inguinal adenopathy        ASSESSMENT/PLAN:       ICD-10-CM    1. Health maintenance examination  Z00.00 CBC with platelets     Comprehensive metabolic panel (BMP + Alb, Alk Phos, ALT, AST, Total. Bili, TP)     Lipid panel reflex to direct LDL Fasting     PSA, screen      2. Need for shingles vaccine  Z23 DISCONTINUED: zoster vaccine recombinant adjuvanted (SHINGRIX) injection      3. Screening for prostate cancer  Z12.5 PSA, screen     CANCELED: PSA, screen      4. Prostate cancer (H)  C61           Patient has been advised of split billing requirements and indicates understanding: Yes      COUNSELING:   Reviewed preventive health counseling, as reflected in patient instructions        He reports that he has never smoked. He has never used smokeless tobacco.            Tesfaye Kelley MD  [facility]

## 2023-10-04 ENCOUNTER — NURSE TRIAGE (OUTPATIENT)
Dept: FAMILY MEDICINE | Facility: CLINIC | Age: 68
End: 2023-10-04
Payer: COMMERCIAL

## 2023-10-04 DIAGNOSIS — U07.1 INFECTION DUE TO 2019 NOVEL CORONAVIRUS: Primary | ICD-10-CM

## 2023-10-04 NOTE — TELEPHONE ENCOUNTER
Patient calling clinic reporting positive Covid test. Patient Triaged and conducted Paxlovid protocol. See results below.     Reason for Disposition   HIGH RISK patient (e.g., weak immune system, age > 64 years, obesity with BMI of 30 or higher, pregnant, chronic lung disease or other chronic medical condition) and COVID symptoms (e.g., cough, fever)  (Exceptions: Already seen by doctor or NP/PA and no new or worsening symptoms.)    Additional Information   Negative: SEVERE difficulty breathing (e.g., struggling for each breath, speaks in single words)   Negative: Difficult to awaken or acting confused (e.g., disoriented, slurred speech)   Negative: Bluish (or gray) lips or face now   Negative: Shock suspected (e.g., cold/pale/clammy skin, too weak to stand, low BP, rapid pulse)   Negative: Sounds like a life-threatening emergency to the triager   Negative: Diagnosed or suspected COVID-19 and symptoms lasting 3 or more weeks   Negative: COVID-19 exposure and no symptoms   Negative: COVID-19 vaccine reaction suspected (e.g., fever, headache, muscle aches) occurring 1 to 3 days after getting vaccine   Negative: COVID-19 vaccine, questions about   Negative: Lives with someone known to have influenza (flu test positive) and flu-like symptoms (e.g., cough, runny nose, sore throat, SOB; with or without fever)   Negative: Possible COVID-19 symptoms and triager concerned about severity of symptoms or other causes   Negative: COVID-19 and breastfeeding, questions about   Negative: SEVERE or constant chest pain or pressure  (Exception: Mild central chest pain, present only when coughing.)   Negative: MODERATE difficulty breathing (e.g., speaks in phrases, SOB even at rest, pulse 100-120)   Negative: Headache and stiff neck (can't touch chin to chest)   Negative: Oxygen level (e.g., pulse oximetry) 90% or lower   Negative: Chest pain or pressure  (Exception: MILD central chest pain, present only when coughing.)   Negative:  Drinking very little and dehydration suspected (e.g., no urine > 12 hours, very dry mouth, very lightheaded)   Negative: Patient sounds very sick or weak to the triager   Negative: MILD difficulty breathing (e.g., minimal/no SOB at rest, SOB with walking, pulse <100)   Negative: Fever > 103 F (39.4 C)   Negative: Fever > 101 F (38.3 C) and over 60 years of age   Negative: Fever > 100.0 F (37.8 C) and bedridden (e.g., CVA, chronic illness, recovering from surgery)    Protocols used: Coronavirus (COVID-19) Diagnosed or [id number]    RN COVID TREATMENT VISIT  10/04/23      The patient has been triaged and does not require a higher level of care.    Francoise Valerio  68 year old  Current weight? 140 lbs    Has the patient been seen by a primary care provider at an [facility] or [facility] within the past two years? Yes.   Have you been in close proximity to/do you have a known exposure to a person with a confirmed case of influenza? No.     General treatment eligibility:  Date of positive COVID test (PCR or at home)?  10/4/2023    Are you or have you been hospitalized for this COVID-19 infection? No.   Have you received monoclonal antibodies or antiviral treatment for COVID-19 since this positive test? No.   Do you have any of the following conditions that place you at risk of being very sick from COVID-19?   - Age 50 years or older  Yes, patient has at least one high risk condition as noted above.     Current COVID symptoms:   - fever or chills  - fatigue  - muscle or body aches  - headache  - sore throat  Yes. Patient has at least one symptom as selected.     How many days since symptoms started? 5 days or less. Established patient, 12 years or older weighing at least 88.2 lbs, who has symptoms that started in the past 5 days, has not been hospitalized nor received treatment already, and is at risk for being very sick from COVID-19.     Treatment eligibility by RN:  Are you currently  pregnant or nursing? No  Do you have a clinically significant hypersensitivity to nirmatrelvir or ritonavir, or toxic epidermal necrolysis (TEN) or Gamez-Levar Syndrome? No  Do you have a history of hepatitis, any hepatic impairment on the Problem List (such as Child-Zhao Class C, cirrhosis, fatty liver disease, alcoholic liver disease), or was the last liver lab (hepatic panel, ALT, AST, ALK Phos, bilirubin) elevated in the past 6 months? No  Do you have any history of severe renal impairment (eGFR < 30mL/min)? No    Is patient eligible to continue? Yes, patient meets all eligibility requirements for the RN COVID treatment (as denoted by all no responses above).     No current outpatient medications on file.       Medications from List 1 of the standing order (on medications that exclude the use of Paxlovid) that patient is taking: NONE. Is patient taking Ordway's Wort? No  Is patient taking Ordway's Wort or any meds from List 1? No.   Medications from List 2 of the standing order (on meds that provider needs to adjust) that patient is taking: NONE. Is patient on any of the meds from List 2? No.   Medications from List 3 of standing order (on meds that a RN needs to adjust) that patient is taking: NONE. Is patient on any meds from List 3? No.     Paxlovid has an approximate 90% reduction in hospitalization. Paxlovid can possibly cause altered sense of taste, diarrhea (loose, watery stools), high blood pressure, muscle aches.     Would patient like a Paxlovid prescription?   Yes.   Lab Results   Component Value Date    GFRESTIMATED >90 09/11/2023       Was last eGFR reduced? No, eGFR 60 or greater/ No Result on record. Patient can receive the normal renal function dose. Paxlovid Rx sent to [facility]     Temporary change to home medications: None    All medication adjustments (holds, etc) were discussed with the patient and patient was asked to repeat back (teachback) their med adjustment.  Did  patient understand med adjustment? No medication adjustments needed.         Reviewed the following instructions with the patient:    Paxlovid (nimatrelvir and ritonavir)    How it works  Two medicines (nirmatrelvir and ritonavir) are taken together. They stop the virus from growing. Less amount of virus is easier for your body to fight.    How to take  Medicine comes in a daily container with both medicine tablets. Take by mouth twice daily (once in the morning, once at night) for 5 days.  The number of tablets to take varies by patient.  Don't chew or break capsules. Swallow whole.    When to take  Take as soon as possible after positive COVID-19 test result, and within 5 days of your first symptoms.    Possible side effects  Can cause altered sense of taste, diarrhea (loose, watery stools), high blood pressure, muscle aches.    Justice L. Phoenix, RN

## 2023-11-28 ENCOUNTER — TELEPHONE (OUTPATIENT)
Dept: UROLOGY | Facility: CLINIC | Age: 68
End: 2023-11-28
Payer: COMMERCIAL

## 2023-11-28 DIAGNOSIS — C61 PROSTATE CANCER (H): Primary | ICD-10-CM

## 2023-11-28 NOTE — TELEPHONE ENCOUNTER
[facility] Call Center    Phone Message    May a detailed message be left on voicemail: yes     Reason for Call: Other: pt calling and wants lab orders sent to [facility], [place], [place] phone [phone]  Fax not sure     Action Taken: Other: urology    Travel Screening: Not Applicable

## 2023-12-12 ENCOUNTER — TRANSFERRED RECORDS (OUTPATIENT)
Dept: HEALTH INFORMATION MANAGEMENT | Facility: CLINIC | Age: 68
End: 2023-12-12
Payer: COMMERCIAL

## 2024-11-03 ENCOUNTER — HEALTH MAINTENANCE LETTER (OUTPATIENT)
Age: 69
End: 2024-11-03

## (undated) DEVICE — NDL INSUFFLATION 13GA 120MM C2201

## (undated) DEVICE — SU MONOCRYL 0 CT-1 27" Y340H

## (undated) DEVICE — SU PDS II 0 CT-2 27" Z334H

## (undated) DEVICE — GLOVE PROTEXIS BLUE W/NEU-THERA 8.0  2D73EB80

## (undated) DEVICE — GLOVE PROTEXIS W/NEU-THERA 7.5  2D73TE75

## (undated) DEVICE — SU VICRYL 4-0 RB-1 27" J304

## (undated) DEVICE — TROCAR AIRSEAL BLADELESS 12X120MM IAS12-120

## (undated) DEVICE — DAVINCI XI GRASPER ENDOWRIST PROGRASP 470093

## (undated) DEVICE — SU VICRYL 0 CT-2 27" J334H

## (undated) DEVICE — GOWN IMPERVIOUS SPECIALTY XL/XLONG 39049

## (undated) DEVICE — SOL WATER IRRIG 1000ML BOTTLE 2F7114

## (undated) DEVICE — ENDO POUCH UNIV RETRIEVAL SYSTEM INZII 10MM CD001

## (undated) DEVICE — TUBING CONMED AIRSEAL SMOKE EVAC INSUFFLATION ASM-EVAC

## (undated) DEVICE — SPONGE RAY-TEC 4X8" 7318

## (undated) DEVICE — SU MONOCRYL 4-0 PS-2 18" UND Y496G

## (undated) DEVICE — DAVINCI XI DRAPE COLUMN 470341

## (undated) DEVICE — PACK DAVINCI UROLOGY SBA15UDFSG

## (undated) DEVICE — DAVINCI XI MONOPOLAR SCISSORS HOT SHEARS 8MM 470179

## (undated) DEVICE — PROTECTOR ARM ONE-STEP TRENDELENBURG 40418

## (undated) DEVICE — CATH FOLEY COUNCIL 18FR 5ML LATEX 0196SI18

## (undated) DEVICE — CATH FOLEY 18FR 5ML LATEX 0165SI18

## (undated) DEVICE — DAVINCI HOT SHEARS TIP COVER  400180

## (undated) DEVICE — DAVINCI XI SEAL UNIVERSAL 5-8MM 470361

## (undated) DEVICE — SU VICRYL 0 TIE 12X18" J906G

## (undated) DEVICE — GLOVE PROTEXIS W/NEU-THERA 6.5  2D73TE65

## (undated) DEVICE — DAVINCI XI DRAPE ARM 470015

## (undated) DEVICE — DAVINCI XI NDL DRIVER LARGE 470006

## (undated) DEVICE — KIT PATIENT POSITIONING PIGAZZI LATEX FREE 40580

## (undated) DEVICE — CLIP ENDO HEMO-LOC PURPLE LG 544240

## (undated) DEVICE — TAPE CLOTH ADHESIVE 3" LATEX FREE

## (undated) DEVICE — SUCTION IRR STRYKERFLOW II W/TIP 250-070-520

## (undated) DEVICE — SU WND CLOSURE VLOC 90 ABS 3-0 VIOLET 6" CV-23 VLOCM0804

## (undated) DEVICE — SU SILK 2-0 FSL 18" 677G

## (undated) DEVICE — SU WND CLOSURE V-LOC 3-0 CV-23 6" VLOCM1904

## (undated) DEVICE — DRAIN JACKSON PRATT CHANNEL 19FR ROUND HUBLESS SIL JP-2230

## (undated) DEVICE — DRAIN JACKSON PRATT RESERVOIR 100ML SU130-1305

## (undated) DEVICE — LINEN TOWEL PACK X5 5464

## (undated) DEVICE — SOL NACL 0.9% INJ 1000ML BAG 2B1324X

## (undated) RX ORDER — DEXAMETHASONE SODIUM PHOSPHATE 4 MG/ML
INJECTION, SOLUTION INTRA-ARTICULAR; INTRALESIONAL; INTRAMUSCULAR; INTRAVENOUS; SOFT TISSUE
Status: DISPENSED
Start: 2021-11-22

## (undated) RX ORDER — GENTAMICIN SULFATE 80 MG/100ML
INJECTION, SOLUTION INTRAVENOUS
Status: DISPENSED
Start: 2021-11-22

## (undated) RX ORDER — NEOSTIGMINE METHYLSULFATE 1 MG/ML
VIAL (ML) INJECTION
Status: DISPENSED
Start: 2021-11-22

## (undated) RX ORDER — PROPOFOL 10 MG/ML
INJECTION, EMULSION INTRAVENOUS
Status: DISPENSED
Start: 2021-11-22

## (undated) RX ORDER — BUPIVACAINE HYDROCHLORIDE 2.5 MG/ML
INJECTION, SOLUTION EPIDURAL; INFILTRATION; INTRACAUDAL
Status: DISPENSED
Start: 2021-11-22

## (undated) RX ORDER — FENTANYL CITRATE 50 UG/ML
INJECTION, SOLUTION INTRAMUSCULAR; INTRAVENOUS
Status: DISPENSED
Start: 2021-11-22

## (undated) RX ORDER — CLINDAMYCIN PHOSPHATE 900 MG/50ML
INJECTION, SOLUTION INTRAVENOUS
Status: DISPENSED
Start: 2021-11-22

## (undated) RX ORDER — GLYCOPYRROLATE 0.2 MG/ML
INJECTION, SOLUTION INTRAMUSCULAR; INTRAVENOUS
Status: DISPENSED
Start: 2021-11-22

## (undated) RX ORDER — ONDANSETRON 2 MG/ML
INJECTION INTRAMUSCULAR; INTRAVENOUS
Status: DISPENSED
Start: 2021-11-22

## (undated) RX ORDER — HEPARIN SODIUM 5000 [USP'U]/.5ML
INJECTION, SOLUTION INTRAVENOUS; SUBCUTANEOUS
Status: DISPENSED
Start: 2021-11-22

## (undated) RX ORDER — LIDOCAINE HYDROCHLORIDE 20 MG/ML
INJECTION, SOLUTION EPIDURAL; INFILTRATION; INTRACAUDAL; PERINEURAL
Status: DISPENSED
Start: 2021-11-22